# Patient Record
Sex: FEMALE | Race: WHITE | NOT HISPANIC OR LATINO | Employment: UNEMPLOYED | URBAN - METROPOLITAN AREA
[De-identification: names, ages, dates, MRNs, and addresses within clinical notes are randomized per-mention and may not be internally consistent; named-entity substitution may affect disease eponyms.]

---

## 2019-10-16 ENCOUNTER — OFFICE VISIT (OUTPATIENT)
Dept: FAMILY MEDICINE | Facility: HOSPITAL | Age: 59
End: 2019-10-16
Attending: SPECIALIST
Payer: MEDICAID

## 2019-10-16 VITALS
DIASTOLIC BLOOD PRESSURE: 80 MMHG | HEIGHT: 64 IN | SYSTOLIC BLOOD PRESSURE: 125 MMHG | HEART RATE: 54 BPM | BODY MASS INDEX: 30.34 KG/M2 | WEIGHT: 177.69 LBS

## 2019-10-16 DIAGNOSIS — Z12.9 SCREENING FOR CANCER: ICD-10-CM

## 2019-10-16 DIAGNOSIS — Z12.11 COLON CANCER SCREENING: ICD-10-CM

## 2019-10-16 DIAGNOSIS — N30.90 CYSTITIS: Primary | ICD-10-CM

## 2019-10-16 DIAGNOSIS — Z12.83 SKIN EXAM, SCREENING FOR CANCER: ICD-10-CM

## 2019-10-16 LAB
BILIRUB SERPL-MCNC: NEGATIVE MG/DL
BLOOD URINE, POC: NEGATIVE
COLOR, POC UA: NORMAL
GLUCOSE UR QL STRIP: NORMAL
KETONES UR QL STRIP: NEGATIVE
LEUKOCYTE ESTERASE URINE, POC: NEGATIVE
NITRITE, POC UA: NEGATIVE
PH, POC UA: 5
PROTEIN, POC: NEGATIVE
SPECIFIC GRAVITY, POC UA: 1.01
UROBILINOGEN, POC UA: NORMAL

## 2019-10-16 PROCEDURE — 90471 IMMUNIZATION ADMIN: CPT

## 2019-10-16 PROCEDURE — 99204 OFFICE O/P NEW MOD 45 MIN: CPT | Performed by: STUDENT IN AN ORGANIZED HEALTH CARE EDUCATION/TRAINING PROGRAM

## 2019-10-16 PROCEDURE — 81002 URINALYSIS NONAUTO W/O SCOPE: CPT | Performed by: STUDENT IN AN ORGANIZED HEALTH CARE EDUCATION/TRAINING PROGRAM

## 2019-10-16 RX ORDER — HYDROXYZINE PAMOATE 25 MG/1
CAPSULE ORAL
COMMUNITY
Start: 2019-09-05 | End: 2020-06-24

## 2019-10-16 RX ORDER — LOVASTATIN 40 MG/1
40 TABLET ORAL NIGHTLY
Qty: 90 TABLET | Refills: 3 | Status: SHIPPED | OUTPATIENT
Start: 2019-10-16 | End: 2020-01-02 | Stop reason: ALTCHOICE

## 2019-10-16 RX ORDER — LISINOPRIL 10 MG/1
10 TABLET ORAL DAILY
COMMUNITY
End: 2020-03-20 | Stop reason: SDUPTHER

## 2019-10-16 RX ORDER — PRAVASTATIN SODIUM 20 MG/1
20 TABLET ORAL NIGHTLY
Refills: 11 | COMMUNITY
Start: 2019-08-09 | End: 2019-10-16

## 2019-10-16 NOTE — PROGRESS NOTES
Subjective:       Patient ID: Tosin Toscano is a 59 y.o. female.    Chief Complaint: Establish Care    58 yo female with history of interstitial cystitis, anxiety presenting as a new patient for anxiety follow up. Patient states that she has tried multiple medications in the past but states that the only medications that helps is Xanax. Patient is aware that it is less likely Xanax would be prescribed. Patient does not want to try an SSRI.     Patient also reports a long-standing history of interstitial cystitis. Patient has had multiple evaluation in the past and states there the only medications that has helped her cystitis was a chinese herbal medicine. Patient states that when she was recently prescribed estrogen that her cystitis returned. Patient has increase urinary frequency and increase urge. Denies any dysuria.     Patient is followed by an Endocrinologist at [facility] for her hypothyroidism.     Patient was recently seen by [facility] at [facility] in 8/2019 but recently moved to [place]. Patient recently had a mammogram, pap smear and labs including CBC, CMP, A1C, Lipid Panel. Patient states that she has hyperlipidemia but did not tolerate her Pravastain. Patient states that she had increased itching which kept her up so she had to stop. Patient is interested in trying another statin.     Patient smokes 1 pack of cigarettes a day for 40 years. Patient quit in 2018. Patient would like to have her low dose CT Scan for lung cancer screening.     Review of Systems   Constitutional: Negative for activity change, fatigue and fever.   Respiratory: Negative for cough, choking, shortness of breath and wheezing.    Cardiovascular: Negative for chest pain and leg swelling.   Gastrointestinal: Negative for constipation, diarrhea, nausea and vomiting.   Genitourinary: Positive for urgency. Negative for difficulty urinating and dyspareunia.   Musculoskeletal: Negative for myalgias.       Objective:      Vitals:    10/16/19  0929   BP: 125/80   Pulse: (!) 54     Physical Exam   Constitutional: She is oriented to person, place, and time. She appears well-developed and well-nourished.   HENT:   Head: Normocephalic and atraumatic.   Eyes: EOM are normal.   Neck: Normal range of motion.   Cardiovascular: Normal rate and regular rhythm.   Pulmonary/Chest: Effort normal and breath sounds normal.   Abdominal: Soft. Bowel sounds are normal. She exhibits no distension.   Musculoskeletal: Normal range of motion. She exhibits no edema.   Neurological: She is alert and oriented to person, place, and time.   Skin: Skin is warm and dry.       Assessment:       1. Cystitis    2. Skin exam, screening for cancer    3. Colon cancer screening    4. Screening for cancer        Plan:       Cystitis  -     POCT URINE DIPSTICK WITHOUT MICROSCOPE: Negative        -     conjugated estrogens (PREMARIN) vaginal cream; Place 0.5 g vaginally once daily. for 21 days  Dispense: 1 applicator; Refill: 0        -    Patient unable to tolerate systemic estrogen. Will try Premarin to see if there is any improvement to her symptoms    Skin exam, screening for cancer  -     Ambulatory referral to Dermatology    Colon cancer screening  -     Ambulatory referral to Gastroenterology       -     Reports family history of colon cancer    Screening for cancer       - Order low dose CT Scan of Chest for lung cancer screening as patient has a 40 pack smoking history and quit in 2018. Patient is interested in the screening    HLD       -   lovastatin (MEVACOR) 40 MG tablet; Take 1 tablet (40 mg total) by mouth every evening.  Dispense: 90 tablet; Refill: 3       -   Will try Lovastatin as patient did not tolerate Pravastatin. Patient will also try red rice yeast.    HM  -     (In Office Administered) Pneumococcal Polysaccharide Vaccine (23 Valent) (SQ/IM)  -      Patient will go to pharmacy for flu shot      Follow up in about 3 months (around 1/16/2020).

## 2019-10-17 ENCOUNTER — TELEPHONE (OUTPATIENT)
Dept: FAMILY MEDICINE | Facility: HOSPITAL | Age: 59
End: 2019-10-17

## 2019-10-21 ENCOUNTER — TELEPHONE (OUTPATIENT)
Dept: FAMILY MEDICINE | Facility: HOSPITAL | Age: 59
End: 2019-10-21

## 2019-12-12 DIAGNOSIS — Z80.0 FAMILY HISTORY OF COLON CANCER: Primary | ICD-10-CM

## 2019-12-12 NOTE — TELEPHONE ENCOUNTER
Patient wants to see if Suprep is covered.        Referring Physician: Dr. Latonia Hadley                             Date: 12/12/19    Reason for Referral: Family history of colon cancer      Family History of:   Colon polyp: No  Relationship/Age of Onset:       Colon cancer: Yes  Relationship/Age of Onset:Mother/  65-70      Patient with:   Hemoccults Done:       Iron deficient: No        On Blood Thinner: No      Valvular heart disease/valve replacement: No      Anemia Present: No      On NSAID: No      Lung disease: No      Kidney disease: No      Hx of polyps: No      Hx of colon cancer: No      Previous colon evalations: Yes  When: 12 years ago  Where: [facility]  Pertinent symptoms:           Review of patient's allergies indicates: NKDA        Patient was scheduled for colonoscopy on  1/7/20      with Dr. Suggs at [facility].       instructions were reviewed with patient.          Prep sent to [facility] pharmacy       SUPREP Instructions    You are scheduled for a colonoscopy with Dr. Suggs on 1/7/20 at [facility].  To ensure that your test is accurate and complete, you MUST follow these instructions listed below.  If you have any questions, please call our office at [phone].  Plan on being at the hospital for your procedure for 3-4 hours.    1.  Follow a CLEAR LIQUID DIET for the entire day before your scheduled colonoscopy.  This means no solid food the entire day starting when you wake.  You may have as much of the clear liquids as you want throughout the day.   CLEAR LIQUID DIET:   - Avoid Red, Orange, Purple, and/or Blue food coloring   - NO DAIRY   - You can have:  Coffee with sugar (no creamer), tea, water, soda, apple or white grape juice, chicken or beef broth/bouillon (no meat, noodles, or veggies), green/yellow popsicles, green/yellow Jell-O, lemonade.    2.  AT 5 pm the evening before your colonoscopy, POUR ONE (1) BOTTLE OF SUPREP INTO THE MIXING  CONTAINER, PROVIDED INSIDE THE BOX.  ADD WATER TO THE LINE ON THE CONTAINER AND MIX IT WELL.  DRINK THE ENTIRE CONTAINER AND THEN DRINK TWO (2) MORE CONTAINERS OF WATER OVER THE NEXT 1 HOUR.  This is sometimes easier to drink if this solution is cold, so you can mix the solution a few hours ahead of time and place in the refrigerator prior to drinking.  You have to drink the solution within 24 hours of mixing it.  Do NOT put this solution over ice.  It IS ok to drink with a straw.    3.  The endoscopy department will call you 2 days before your colonoscopy to tell you the exact time to arrive, AND to tell you the exact time to drink the 2nd portion of your prep (which will be FIVE HOURS BEFORE YOUR ARRIVAL TIME).  At this time given to you, POUR ONE (1) BOTTLE OF SUPREP INTO THE MIXING CONTAINER, PROVIDED INSIDE THE BOX.  ADD WATER TO THE LINE ON THE CONTAINER AND MIX IT WELL.  DRINK THE ENTIRE CONTAINER AND THEN DRINK TWO (2) MORE CONTAINERS OF WATER OVER THE NEXT 1 HOUR.  This is sometimes easier to drink if this solution is cold, so you can mix the solution a few hours ahead of time and place in the refrigerator prior to drinking.  You have to drink the solution within 24 hours of mixing it.  Do NOT put this solution over ice.  It IS ok to drink with a straw. Once this is complete, you may not have ANYTHING else by mouth!    4.  You must have someone with you to DRIVE YOU HOME since you will be receiving IV sedation for the colonoscopy.    5.  It is ok to take your heart, blood pressure, and seizure medications in the morning of your test with a SIP of water.  Hold other medications until after your procedure.  Do NOT have anything else to eat or drink the morning of your colonoscopy.  It is ok to brush your teeth.    6.  If you are on blood thinners THAT YOU HAVE BEEN INSTRUCTED TO HOLD BY YOUR DOCTOR FOR THIS PROCEDURE, then do NOT take this the morning of your colonoscopy.  Do NOT stop these medications on  your own, they must be approved to be held by your doctor.  Your colonoscopy can NOT be done if you are on these medications.  Examples of blood thinners include: Coumadin, Aggrenox, Plavix, Pradaxa, Reapro, Pletal, Xarelto, Ticagrelor, Brilinta, Eliquis, and high dose aspirin (325 mg).  You do not have to stop baby aspirin 81 mg.    7.  IF YOU ARE DIABETIC:  NO INSULIN OR ORAL MEDICATIONS THE MORNING OF THE COLONOSCOPY.  TAKE ONLY HALF THE DOSE OF YOUR INSULIN THE DAY BEFORE THE COLONOSCOPY.  DO NOT TAKE ANY ORAL DIABETIC MEDICATIONS THE DAY BEFORE THE COLONOSCOPY.  IF YOU ARE AN INSULIN DEPENDENT DIABETIC WITH UNSTABLE BLOOD SUGARS, NOTIFY YOUR PRIMARY CARE PHYSICIAN FOR INSTRUCTIONS.

## 2019-12-13 RX ORDER — SODIUM, POTASSIUM,MAG SULFATES 17.5-3.13G
1 SOLUTION, RECONSTITUTED, ORAL ORAL DAILY
Qty: 1 KIT | Refills: 0 | Status: SHIPPED | OUTPATIENT
Start: 2019-12-13 | End: 2019-12-15

## 2020-01-07 PROBLEM — Z80.0 FAMILY HISTORY OF COLON CANCER: Status: ACTIVE | Noted: 2020-01-07

## 2020-01-20 ENCOUNTER — TELEPHONE (OUTPATIENT)
Dept: GASTROENTEROLOGY | Facility: CLINIC | Age: 60
End: 2020-01-20

## 2020-01-20 NOTE — TELEPHONE ENCOUNTER
Informed patient of results and need to repeat colonoscopy in 5 years. Recall entered in computer.

## 2020-01-20 NOTE — TELEPHONE ENCOUNTER
----- Message from Tammy Suggs MD sent at 1/13/2020  9:43 AM CST -----  Benign tubular adenoma, repeat 5 years.  Her labs are negative for celiac disease.  Message sent to pt via the portal.

## 2020-03-20 RX ORDER — LEVOTHYROXINE SODIUM 150 UG/1
150 TABLET ORAL
Qty: 30 TABLET | Refills: 6 | Status: SHIPPED | OUTPATIENT
Start: 2020-03-20 | End: 2020-11-18 | Stop reason: SDUPTHER

## 2020-03-20 RX ORDER — LOVASTATIN 40 MG/1
1 TABLET ORAL DAILY
COMMUNITY
Start: 2020-03-18 | End: 2020-03-20 | Stop reason: SDUPTHER

## 2020-03-20 RX ORDER — LOVASTATIN 40 MG/1
40 TABLET ORAL DAILY
Qty: 30 TABLET | Refills: 6 | Status: SHIPPED | OUTPATIENT
Start: 2020-03-20 | End: 2020-06-24

## 2020-03-20 RX ORDER — LISINOPRIL 10 MG/1
10 TABLET ORAL DAILY
Qty: 30 TABLET | Refills: 6 | Status: SHIPPED | OUTPATIENT
Start: 2020-03-20 | End: 2020-06-24

## 2020-03-20 NOTE — TELEPHONE ENCOUNTER
----- Message from Ciara Zamudio sent at 3/20/2020 11:41 AM CDT -----  Contact: Tosin Leahy called requesting labs to be ordered, in order to continue her thyroid medication (levothyroxine (LEVOXYL) 150 MCG tablet) , high blood pressure medication (lisinopril 10 MG tablet) and lovastatin (MEVACOR) 40 MG tablet. Patient is okay for prescription being renewed without labs, she states she feels fine. But is worried about not having access to her medication due to being displaced because of COVID19. Please call [phone] to discuss.

## 2020-06-24 ENCOUNTER — OFFICE VISIT (OUTPATIENT)
Dept: FAMILY MEDICINE | Facility: HOSPITAL | Age: 60
End: 2020-06-24
Payer: MEDICAID

## 2020-06-24 ENCOUNTER — IMMUNIZATION (OUTPATIENT)
Dept: PHARMACY | Facility: CLINIC | Age: 60
End: 2020-06-24
Payer: MEDICAID

## 2020-06-24 VITALS
WEIGHT: 181.19 LBS | SYSTOLIC BLOOD PRESSURE: 152 MMHG | DIASTOLIC BLOOD PRESSURE: 78 MMHG | HEART RATE: 51 BPM | BODY MASS INDEX: 30.93 KG/M2 | HEIGHT: 64 IN

## 2020-06-24 DIAGNOSIS — E03.9 ACQUIRED HYPOTHYROIDISM: ICD-10-CM

## 2020-06-24 DIAGNOSIS — I10 ESSENTIAL HYPERTENSION: ICD-10-CM

## 2020-06-24 DIAGNOSIS — E55.9 VITAMIN D DEFICIENCY: ICD-10-CM

## 2020-06-24 DIAGNOSIS — Z20.822 EXPOSURE TO COVID-19 VIRUS: ICD-10-CM

## 2020-06-24 DIAGNOSIS — N28.1 RENAL CYST: Primary | ICD-10-CM

## 2020-06-24 DIAGNOSIS — Z12.39 BREAST CANCER SCREENING: ICD-10-CM

## 2020-06-24 PROCEDURE — 99213 OFFICE O/P EST LOW 20 MIN: CPT | Performed by: STUDENT IN AN ORGANIZED HEALTH CARE EDUCATION/TRAINING PROGRAM

## 2020-06-24 PROCEDURE — 90471 IMMUNIZATION ADMIN: CPT

## 2020-06-24 RX ORDER — MICONAZOLE NITRATE 2 %
2 CREAM (GRAM) TOPICAL 2 TIMES DAILY PRN
Qty: 100 EACH | Refills: 3 | Status: SHIPPED | OUTPATIENT
Start: 2020-06-24 | End: 2022-05-04

## 2020-06-24 RX ORDER — ROSUVASTATIN CALCIUM 20 MG/1
20 TABLET, COATED ORAL DAILY
Qty: 90 TABLET | Refills: 3 | Status: SHIPPED | OUTPATIENT
Start: 2020-06-24 | End: 2021-11-22

## 2020-06-24 RX ORDER — MICONAZOLE NITRATE 2 %
2 CREAM (GRAM) TOPICAL
Qty: 90 EACH | Refills: 3 | Status: SHIPPED | OUTPATIENT
Start: 2020-06-24 | End: 2020-06-24

## 2020-06-24 RX ORDER — LISINOPRIL 20 MG/1
20 TABLET ORAL DAILY
Qty: 90 TABLET | Refills: 3 | Status: SHIPPED | OUTPATIENT
Start: 2020-06-24 | End: 2021-11-22 | Stop reason: SDUPTHER

## 2020-06-24 NOTE — PROGRESS NOTES
Two patient identifiers verified.  Allergies reviewed. Pneumovax 23  IM administered to Left deltoid per MD order.  Patient tolerated injection well; no redness, bleeding, or bruising noted to injection site.  Patient instructed to remain in clinic setting for 15 minutes.  Verbalizes understanding.

## 2020-06-25 ENCOUNTER — TELEPHONE (OUTPATIENT)
Dept: FAMILY MEDICINE | Facility: HOSPITAL | Age: 60
End: 2020-06-25

## 2020-06-25 NOTE — PROGRESS NOTES
Subjective:       Patient ID: Tosin Toscano is a 59 y.o. female.    Chief Complaint: Hypertension    60 yo with history of hypothyroidism, HTN, HLD presenting for hypertension follow up. Patient states that she has been compliant with her medication and has noted an increased in her blood pressure at her last few doctor's appointment. Patient follows with endocrinology for her hypothyroidism and had a list of labs that her doctor wanted her to do.     Patient has not been able to take a statin. Patient has tried pravastatin and lovastatin. However, she has experienced itching with both statins.     Patient quit smoking in 2018 but continues to meet criteria for lung cancer screening. However, she has not completed her CT Scan for lung cancer screening.     Patient reports a 3.1cm left renal cysts and would like to have a repeat US to follow up her cyst.     Pap smear was in 2019, hx of conization 22 years ago.     Review of Systems   Constitutional: Negative for chills, fatigue and fever.   HENT: Negative for rhinorrhea and sinus pain.    Eyes: Negative for photophobia and visual disturbance.   Respiratory: Negative for cough and shortness of breath.    Cardiovascular: Negative for chest pain, palpitations and leg swelling.   Gastrointestinal: Negative for abdominal pain, constipation, diarrhea, nausea and vomiting.   Endocrine: Negative for polyuria.   Genitourinary: Negative for dysuria, frequency, hematuria and urgency.   Musculoskeletal: Negative for arthralgias and gait problem.   Skin: Negative for rash.   Neurological: Negative for syncope, weakness and headaches.   Psychiatric/Behavioral: Negative for agitation and confusion.       Objective:      Vitals:    06/24/20 1112   BP: (!) 152/78   Pulse: (!) 51   repeat blood pressure 140/80  Physical Exam  Constitutional:       Appearance: She is well-developed.   HENT:      Head: Normocephalic and atraumatic.   Eyes:      Conjunctiva/sclera: Conjunctivae normal.       Pupils: Pupils are equal, round, and reactive to light.   Neck:      Musculoskeletal: Normal range of motion and neck supple.   Cardiovascular:      Rate and Rhythm: Normal rate and regular rhythm.   Pulmonary:      Effort: Pulmonary effort is normal. No respiratory distress.      Breath sounds: Normal breath sounds. No stridor. No wheezing.   Abdominal:      General: Bowel sounds are normal. There is no distension.      Palpations: Abdomen is soft. There is no mass.      Tenderness: There is no abdominal tenderness. There is no guarding.   Musculoskeletal: Normal range of motion.         General: No tenderness or deformity.   Skin:     General: Skin is warm and dry.      Capillary Refill: Capillary refill takes less than 2 seconds.   Neurological:      Mental Status: She is alert and oriented to person, place, and time.         Assessment:       1. Renal cyst    2. Essential hypertension    3. Acquired hypothyroidism    4. Vitamin D deficiency    5. Exposure to Covid-19 Virus    6. Breast cancer screening        Plan:       Renal cyst  -     US Retroperitoneal Complete; Future; Expected date: 06/24/2020    Essential hypertension  -     CBC auto differential; Future; Expected date: 06/24/2020  -     Comprehensive metabolic panel; Future; Expected date: 06/24/2020  -     Lipid Panel; Future; Expected date: 06/24/2020  -     lisinopriL (PRINIVIL,ZESTRIL) 20 MG tablet; Take 1 tablet (20 mg total) by mouth once daily.  Dispense: 90 tablet; Refill: 3  -     rosuvastatin (CRESTOR) 20 MG tablet; Take 1 tablet (20 mg total) by mouth once daily.  Dispense: 90 tablet; Refill: 3    Acquired hypothyroidism  -     Estradiol; Future; Expected date: 06/24/2020  -     Follicle stimulating hormone; Future; Expected date: 06/24/2020  -     TSH; Future; Expected date: 06/24/2020  -     T3, free; Future; Expected date: 06/24/2020  -     T4, free; Future; Expected date: 06/24/2020    Vitamin D deficiency  -     VITAMIN D; Future;  Expected date: 06/24/2020    Exposure to Covid-19 Virus  -     COVID-19 (SARS CoV-2) IgG Antibody; Future; Expected date: 06/24/2020    Breast cancer screening  -     Mammo Digital Screening Bilateral With CAD; Future; Expected date: 06/24/2020    Smoking cessation   -     nicotine, polacrilex, (NICORETTE) 2 mg Gum; Take 1 each (2 mg total) by mouth 2 (two) times daily as needed.  Dispense: 100 each; Refill: 3    Vaccination   -     (In Office Administered) Pneumococcal Polysaccharide Vaccine (23 Valent) (SQ/IM)    Follow up in about 1 month (around 7/24/2020). Encouraged patient to call and schedule her CT Scan for lung cancer screening. Increasing Lisinopril to 20 mg and will follow up with patient.

## 2020-07-09 ENCOUNTER — TELEPHONE (OUTPATIENT)
Dept: FAMILY MEDICINE | Facility: HOSPITAL | Age: 60
End: 2020-07-09

## 2020-07-15 ENCOUNTER — TELEPHONE (OUTPATIENT)
Dept: FAMILY MEDICINE | Facility: HOSPITAL | Age: 60
End: 2020-07-15

## 2020-07-15 NOTE — TELEPHONE ENCOUNTER
I called patient regarding labs. Patient has restarted her Crestor and has not experienced any muscle aches. Will check Lipid Panel again in the future.

## 2020-07-28 ENCOUNTER — HOSPITAL ENCOUNTER (OUTPATIENT)
Dept: RADIOLOGY | Facility: HOSPITAL | Age: 60
Discharge: HOME | End: 2020-07-28
Attending: STUDENT IN AN ORGANIZED HEALTH CARE EDUCATION/TRAINING PROGRAM
Payer: MEDICAID

## 2020-07-28 DIAGNOSIS — N28.1 RENAL CYST: ICD-10-CM

## 2020-07-28 PROCEDURE — 76770 US EXAM ABDO BACK WALL COMP: CPT | Mod: 26,,, | Performed by: RADIOLOGY

## 2020-07-28 PROCEDURE — 76770 US EXAM ABDO BACK WALL COMP: CPT | Mod: TC

## 2020-07-28 PROCEDURE — 76770 US RETROPERITONEAL COMPLETE: ICD-10-PCS | Mod: 26,,, | Performed by: RADIOLOGY

## 2020-08-25 ENCOUNTER — TELEPHONE (OUTPATIENT)
Dept: FAMILY MEDICINE | Facility: HOSPITAL | Age: 60
End: 2020-08-25

## 2020-11-18 RX ORDER — LEVOTHYROXINE SODIUM 150 UG/1
150 TABLET ORAL
Qty: 30 TABLET | Refills: 0 | Status: SHIPPED | OUTPATIENT
Start: 2020-11-18 | End: 2021-11-22 | Stop reason: SDUPTHER

## 2020-11-18 NOTE — TELEPHONE ENCOUNTER
----- Message from Charu Helms sent at 11/18/2020 11:19 AM CST -----  PT NEED REFILL ON levothyroxine (LEVOXYL) 150 MCG tablet, SHE NEEDS IT SENT TO     [facility]  PH: [phone]  [address] [place] [address]    PH: [phone] PATIENT NUMBER

## 2021-11-15 PROBLEM — M54.31 RIGHT SIDED SCIATICA: Status: ACTIVE | Noted: 2021-11-15

## 2022-03-02 PROBLEM — I10 ESSENTIAL HYPERTENSION: Status: ACTIVE | Noted: 2019-08-01

## 2022-03-02 PROBLEM — Z00.00 PREVENTATIVE HEALTH CARE: Status: ACTIVE | Noted: 2017-11-08

## 2022-03-02 PROBLEM — D24.9 FIBROADENOMA OF BREAST: Status: ACTIVE | Noted: 2019-08-01

## 2022-03-02 PROBLEM — E55.9 VITAMIN D DEFICIENCY: Status: ACTIVE | Noted: 2022-03-02

## 2022-03-02 PROBLEM — G93.32 CHRONIC FATIGUE SYNDROME: Status: ACTIVE | Noted: 2022-03-02

## 2022-03-02 PROBLEM — M54.31 NEURALGIA OF RIGHT SCIATIC NERVE: Status: ACTIVE | Noted: 2022-03-02

## 2022-03-02 PROBLEM — R87.610 ASCUS OF CERVIX WITH NEGATIVE HIGH RISK HPV: Status: ACTIVE | Noted: 2021-12-28

## 2022-03-02 PROBLEM — R23.2 HOT FLASHES: Status: ACTIVE | Noted: 2019-08-01

## 2022-03-02 PROBLEM — N64.3 GALACTORRHEA IN FEMALE: Status: ACTIVE | Noted: 2019-08-01

## 2022-03-02 PROBLEM — E78.2 MIXED HYPERLIPIDEMIA: Status: ACTIVE | Noted: 2019-08-01

## 2022-03-02 PROBLEM — Z78.0 MENOPAUSE: Status: ACTIVE | Noted: 2019-08-01

## 2022-03-02 PROBLEM — R35.0 INCREASED FREQUENCY OF URINATION: Status: ACTIVE | Noted: 2019-08-01

## 2022-03-02 PROBLEM — N30.10 INTERSTITIAL CYSTITIS: Status: ACTIVE | Noted: 2021-06-29

## 2022-03-15 PROBLEM — I25.10 NONOBSTRUCTIVE ATHEROSCLEROSIS OF CORONARY ARTERY: Status: ACTIVE | Noted: 2022-03-15

## 2022-05-19 ENCOUNTER — OFFICE VISIT (OUTPATIENT)
Dept: UROLOGY | Facility: CLINIC | Age: 62
End: 2022-05-19
Payer: OTHER GOVERNMENT

## 2022-05-19 VITALS — WEIGHT: 178.56 LBS | BODY MASS INDEX: 30.48 KG/M2 | HEIGHT: 64 IN

## 2022-05-19 DIAGNOSIS — N30.10 INTERSTITIAL CYSTITIS: Primary | ICD-10-CM

## 2022-05-19 DIAGNOSIS — N28.1 RENAL CYST, LEFT: ICD-10-CM

## 2022-05-19 DIAGNOSIS — R39.15 URINARY URGENCY: ICD-10-CM

## 2022-05-19 DIAGNOSIS — R35.0 URINARY FREQUENCY: ICD-10-CM

## 2022-05-19 PROCEDURE — 96372 THER/PROPH/DIAG INJ SC/IM: CPT | Mod: PBBFAC

## 2022-05-19 PROCEDURE — 99999 PR PBB SHADOW E&M-EST. PATIENT-LVL III: CPT | Mod: PBBFAC,,, | Performed by: UROLOGY

## 2022-05-19 PROCEDURE — 99213 OFFICE O/P EST LOW 20 MIN: CPT | Mod: PBBFAC | Performed by: UROLOGY

## 2022-05-19 PROCEDURE — 99999 PR PBB SHADOW E&M-EST. PATIENT-LVL III: ICD-10-PCS | Mod: PBBFAC,,, | Performed by: UROLOGY

## 2022-05-19 PROCEDURE — 99205 OFFICE O/P NEW HI 60 MIN: CPT | Mod: S$PBB,,, | Performed by: UROLOGY

## 2022-05-19 PROCEDURE — 51700 IRRIGATION OF BLADDER: CPT | Mod: PBBFAC

## 2022-05-19 PROCEDURE — 99205 PR OFFICE/OUTPT VISIT, NEW, LEVL V, 60-74 MIN: ICD-10-PCS | Mod: S$PBB,,, | Performed by: UROLOGY

## 2022-05-19 PROCEDURE — 81002 URINALYSIS NONAUTO W/O SCOPE: CPT | Mod: PBBFAC | Performed by: UROLOGY

## 2022-05-19 PROCEDURE — 51701 INSERT BLADDER CATHETER: CPT | Mod: PBBFAC | Performed by: UROLOGY

## 2022-05-19 RX ORDER — MIRABEGRON 50 MG/1
1 TABLET, FILM COATED, EXTENDED RELEASE ORAL DAILY
Qty: 30 TABLET | Refills: 11 | Status: SHIPPED | OUTPATIENT
Start: 2022-05-19 | End: 2022-05-27 | Stop reason: SDUPTHER

## 2022-05-19 RX ORDER — LIDOCAINE HYDROCHLORIDE 10 MG/ML
20 INJECTION INFILTRATION; PERINEURAL
Status: COMPLETED | OUTPATIENT
Start: 2022-05-19 | End: 2022-05-19

## 2022-05-19 RX ORDER — HEPARIN SODIUM 10000 [USP'U]/ML
10000 INJECTION, SOLUTION INTRAVENOUS; SUBCUTANEOUS
Status: COMPLETED | OUTPATIENT
Start: 2022-05-19 | End: 2022-05-19

## 2022-05-19 RX ORDER — INDOMETHACIN 25 MG/1
50 CAPSULE ORAL
Status: COMPLETED | OUTPATIENT
Start: 2022-05-19 | End: 2022-05-19

## 2022-05-19 RX ORDER — BUPIVACAINE HYDROCHLORIDE 5 MG/ML
20 INJECTION, SOLUTION EPIDURAL; INTRACAUDAL
Status: COMPLETED | OUTPATIENT
Start: 2022-05-19 | End: 2022-05-19

## 2022-05-19 RX ADMIN — BUPIVACAINE HYDROCHLORIDE 100 MG: 5 INJECTION, SOLUTION EPIDURAL; INTRACAUDAL; PERINEURAL at 11:05

## 2022-05-19 RX ADMIN — HYDROCORTISONE SODIUM SUCCINATE 100 MG: 100 INJECTION, POWDER, FOR SOLUTION INTRAMUSCULAR; INTRAVENOUS at 11:05

## 2022-05-19 RX ADMIN — LIDOCAINE HYDROCHLORIDE 20 ML: 10 INJECTION, SOLUTION INFILTRATION; PERINEURAL at 11:05

## 2022-05-19 RX ADMIN — DIMETHYL SULFOXIDE 50 ML: 0.54 IRRIGANT INTRAVESICAL at 11:05

## 2022-05-19 RX ADMIN — HEPARIN SODIUM 10000 UNITS: 10000 INJECTION, SOLUTION INTRAVENOUS; SUBCUTANEOUS at 11:05

## 2022-05-19 RX ADMIN — SODIUM BICARBONATE 50 MEQ: 84 INJECTION, SOLUTION INTRAVENOUS at 11:05

## 2022-05-19 NOTE — PROGRESS NOTES
CHIEF COMPLAINT:    Mrs. Toscano is a 61 y.o. female presenting for a consultation from DIDI Ramey for IC    PRESENTING ILLNESS:    Tosin Toscano is a 61 y.o. female who states she has a 27 year history of IC.  She was diagnosed with Dr. Brewer on the [facility].  She has most recently been seen by Dr. Jones at [facility], whom she states balked at the idea of using Rimso.  She has had good results with it for the pelvic pain associated with a full bladder and the urgency frequency.      She states when she was first diagnosed, she was treated like this was all in her head.  She saw a number of doctors (underwent cystoscopy, diagnostic laparoscopy, IVP)  then saw a herbalist in [facility] who offered her therapeutic teas and this helped a lot.  However, when she went back to him recently, he was compounding the medication into gummie candies and this did not seem to work as well.  She was placed on Toviaz and hyoscyamine which she could not tolerate due to dry eyes.  At this point, she has discomfort with her bladder full, relieved when she voids, she has constant urgency (PVR today was 14 by bladder scan) frequency.      She is scheduled to have her right hip replaced and does not wish to have anything jeopardize her surgery.      , uterus in place, no pain with intercourse or days after, bowels are normal    REVIEW OF SYSTEMS:    Review of Systems   Constitutional: Negative.    HENT: Negative.    Eyes: Negative.    Respiratory: Negative.    Cardiovascular: Negative.    Gastrointestinal: Negative.    Genitourinary: Positive for frequency and urgency.   Musculoskeletal: Positive for joint pain.   Skin: Negative.    Neurological: Negative.    Endo/Heme/Allergies: Negative.    Psychiatric/Behavioral: The patient is nervous/anxious.        PATIENT HISTORY:    Past Medical History:   Diagnosis Date    Anticoagulant long-term use     Arthritis     Cystitis     interstitial cystitis    Generalized  anxiety disorder     Hypertension     Mixed hyperlipidemia     Renal disorder     interstitial cystitis    Sleep apnea in adult 2022    mild case     Thyroid disease        Past Surgical History:   Procedure Laterality Date    BREAST BIOPSY Left     BREAST CYST ASPIRATION       SECTION      COLONOSCOPY      COLONOSCOPY N/A 2020    Procedure: COLONOSCOPY;  Surgeon: Tammy Suggs MD;  Location: [facility] ENDO;  Service: Endoscopy;  Laterality: N/A;    LEFT HEART CATHETERIZATION Left 2021    Procedure: Left heart cath;  Surgeon: Montserrat Rodriguez MD;  [facility] CATH;  Service: Cardiology;  Laterality: Left;    PILONIDAL CYST DRAINAGE         Family History   Problem Relation Age of Onset    Colon cancer Mother     Diabetes Brother     Cancer Brother     Coronary artery disease Brother        Social History     Socioeconomic History    Marital status:    Occupational History     Employer: [employer] in [place]   Tobacco Use    Smoking status: Former Smoker     Packs/day: 0.00     Years: 30.00     Pack years: 0.00     Quit date: 2018     Years since quitting: 3.7    Smokeless tobacco: Never Used   Substance and Sexual Activity    Alcohol use: Yes     Comment: occ    Drug use: Never       Allergies:  Patient has no known allergies.    Medications:  Outpatient Encounter Medications as of 2022   Medication Sig Dispense Refill    ALPRAZolam (XANAX) 0.25 MG tablet TAKE ONE TABLET BY MOUTH NIGHTLY AT BEDTIME 30 tablet 2    aspirin (ECOTRIN) 81 MG EC tablet Take 1 tablet (81 mg total) by mouth once daily. 90 tablet 3    atorvastatin (LIPITOR) 80 MG tablet Take 1 tablet (80 mg total) by mouth once daily. 90 tablet 3    CALCIUM-MAGNESIUM ORAL Take 3 tablets by mouth once daily.      levothyroxine (SYNTHROID) 125 MCG tablet Take 125 mcg by mouth before breakfast.      lisinopriL (PRINIVIL,ZESTRIL) 40 MG tablet Take 40 mg by mouth every morning.       meloxicam (MOBIC) 15 MG tablet Take 1 tablet by mouth daily as needed.      traMADoL (ULTRAM) 50 mg tablet Take 50 mg by mouth every 6 (six) hours.      vitamin D (VITAMIN D3) 1000 units Tab Take 2,000 Units by mouth once daily.      mirabegron (MYRBETRIQ) 50 mg Tb24 Take 1 tablet (50 mg total) by mouth once daily. 30 tablet 11     Facility-Administered Encounter Medications as of 5/19/2022   Medication Dose Route Frequency Provider Last Rate Last Admin    [COMPLETED] BUPivacaine (PF) 0.5% (5 mg/mL) injection 100 mg  20 mL Intravesical 1 time in Clinic/ANNETTE Resendiz MD   100 mg at 05/19/22 1130    [COMPLETED] dimethyl sulfoxide 50 % solution 50 mL  50 mL Intravesical 1 time in Clinic/ANNETTE Resendiz MD   50 mL at 05/19/22 1134    [COMPLETED] heparin (porcine) injection 10,000 Units  10,000 Units Intravesical 1 time in Clinic/ANNETTE Resendiz MD   10,000 Units at 05/19/22 1132    [COMPLETED] hydrocortisone sodium succinate injection 100 mg  100 mg Intravesical 1 time in Clinic/ANNETTE Resendiz MD   100 mg at 05/19/22 1132    [COMPLETED] LIDOcaine HCL 10 mg/ml (1%) injection 20 mL  20 mL Intravesical 1 time in Clinic/ANNETTE Resendiz MD   20 mL at 05/19/22 1131    [COMPLETED] sodium bicarbonate solution 50 mEq  50 mEq Intravesical 1 time in Clinic/ANNETTE Resendiz MD   50 mEq at 05/19/22 1133    sodium chloride 0.9% flush 10 mL  10 mL Intravenous PRN Tammy Suggs MD             PHYSICAL EXAMINATION:    The patient generally appears in good health, is appropriately interactive, and is in no apparent distress.    Skin: No lesions.    Mental: Cooperative with normal affect.    Neuro: Grossly intact.    HEENT: Normal. No evidence of lymphadenopathy.    Chest:  normal inspiratory effort.    Abdomen:  Soft, non-tender. No masses or organomegaly. Bladder is not palpable. No evidence of flank discomfort. No evidence of inguinal hernia.    Extremities: No clubbing,  cyanosis, or edema    Normal external female genitalia  Grade II urogenital atrophy  Urethral meatus is normal  Urethra and bladder are nontender to bimanual exam  Well supported anteriorly and posteriorly   Uterus and cervix are normal  No adnexal masses  PVR by bladder scan was 14 ml    LABS:    Lab Results   Component Value Date    BUN 19 (H) 03/04/2022    CREATININE 0.59 03/04/2022     UA 1.015, pH 5, otherwise, negative    Renal ultrasound from 7/28/2020 showed a 3.2 left renal cyst    IMPRESSION:    Encounter Diagnoses   Name Primary?    Interstitial cystitis Yes    Urinary frequency     Urinary urgency     Renal cyst, left        PLAN:    1.  Renal ultrasound to follow up on the cyst  2.  Placed a bladder cocktail of DMSO, Bicarb, lidocaine, Marcaine, solucortef and heparin by gravity drainage after draining the bladder,she had approx 20 ml in the bladder  3.  Myrbetriq rx'd.  The Toviaz and hyoscyamine were removed from the medcard  4.  cystoscopy under sedation.  Consent signed.

## 2022-05-24 ENCOUNTER — TELEPHONE (OUTPATIENT)
Dept: UROLOGY | Facility: CLINIC | Age: 62
End: 2022-05-24
Payer: OTHER GOVERNMENT

## 2022-05-24 NOTE — TELEPHONE ENCOUNTER
----- Message from Kermit Soto sent at 5/24/2022  8:41 AM CDT -----  Regarding: call back to resched her Cystoscopy      The Pt states that she did send a msg last Friday to let you know that she won't be able to do her cystoscopy this week and will need to resched it after her hip surgery.    Please contact the Pt to resched    Ph #  [phone]

## 2022-05-25 NOTE — TELEPHONE ENCOUNTER
Left message that her message was received.  She will need to let me know when she has recovered to reschedule.  Message sent to the surgery scheduler to cancel her case outright.

## 2022-05-27 ENCOUNTER — TELEPHONE (OUTPATIENT)
Dept: UROLOGY | Facility: CLINIC | Age: 62
End: 2022-05-27
Payer: OTHER GOVERNMENT

## 2022-05-27 RX ORDER — MIRABEGRON 50 MG/1
1 TABLET, FILM COATED, EXTENDED RELEASE ORAL DAILY
Qty: 30 TABLET | Refills: 11 | Status: SHIPPED | OUTPATIENT
Start: 2022-05-27 | End: 2022-06-18 | Stop reason: CLARIF

## 2022-05-27 NOTE — TELEPHONE ENCOUNTER
Received notification that the patient needed a prior authorization.  I called the patient to let her know that it will be done if the Rx is sent to the [facility].     She states that she is going through too much right now to schedule the cystoscopy possible bladder biopsy and fulguration.  She wishes to do it after her surgery.    She also wanted to start the Myrbetriq after surgery.  Discussed it is a quality of life issue however, she will want to have started it before surgery because it takes 2-3 weeks to become effective.  Myrbetriq is one of the safer medications to take and won't cause constipation. She expressed understanding.     She will reach out to my office once she has recovered from her other surgery.  I made it clear that the responsibility is hers at this point.

## 2022-05-31 ENCOUNTER — TELEPHONE (OUTPATIENT)
Dept: PHARMACY | Facility: CLINIC | Age: 62
End: 2022-05-31
Payer: OTHER GOVERNMENT

## 2022-06-02 ENCOUNTER — PATIENT MESSAGE (OUTPATIENT)
Dept: UROLOGY | Facility: CLINIC | Age: 62
End: 2022-06-02
Payer: OTHER GOVERNMENT

## 2022-06-06 PROBLEM — Z00.00 PREVENTATIVE HEALTH CARE: Status: RESOLVED | Noted: 2017-11-08 | Resolved: 2022-06-06

## 2022-06-18 ENCOUNTER — PATIENT MESSAGE (OUTPATIENT)
Dept: UROLOGY | Facility: CLINIC | Age: 62
End: 2022-06-18
Payer: OTHER GOVERNMENT

## 2022-06-18 ENCOUNTER — TELEPHONE (OUTPATIENT)
Dept: UROLOGY | Facility: HOSPITAL | Age: 62
End: 2022-06-18
Payer: OTHER GOVERNMENT

## 2022-06-18 DIAGNOSIS — R35.0 URINARY FREQUENCY: Primary | ICD-10-CM

## 2022-06-18 DIAGNOSIS — R39.15 URINARY URGENCY: ICD-10-CM

## 2022-06-18 RX ORDER — VIBEGRON 75 MG/1
75 TABLET, FILM COATED ORAL DAILY
Qty: 30 TABLET | Refills: 11 | Status: SHIPPED | OUTPATIENT
Start: 2022-06-18 | End: 2022-10-31

## 2022-06-21 PROBLEM — M16.11 PRIMARY OSTEOARTHRITIS OF RIGHT HIP: Status: ACTIVE | Noted: 2022-06-21

## 2022-09-13 ENCOUNTER — PATIENT MESSAGE (OUTPATIENT)
Dept: UROLOGY | Facility: CLINIC | Age: 62
End: 2022-09-13
Payer: OTHER GOVERNMENT

## 2022-09-14 ENCOUNTER — TELEPHONE (OUTPATIENT)
Dept: UROLOGY | Facility: CLINIC | Age: 62
End: 2022-09-14
Payer: OTHER GOVERNMENT

## 2022-09-14 DIAGNOSIS — N30.10 CHRONIC INTERSTITIAL CYSTITIS: Primary | ICD-10-CM

## 2022-10-09 ENCOUNTER — PATIENT MESSAGE (OUTPATIENT)
Dept: UROLOGY | Facility: CLINIC | Age: 62
End: 2022-10-09
Payer: OTHER GOVERNMENT

## 2022-10-10 ENCOUNTER — TELEPHONE (OUTPATIENT)
Dept: UROLOGY | Facility: CLINIC | Age: 62
End: 2022-10-10
Payer: OTHER GOVERNMENT

## 2022-10-10 RX ORDER — AMOXICILLIN 500 MG/1
500 TABLET, FILM COATED ORAL
COMMUNITY
End: 2022-10-31

## 2022-10-10 RX ORDER — LEVOTHYROXINE SODIUM 112 UG/1
112 TABLET ORAL
COMMUNITY
End: 2022-10-31

## 2022-10-10 NOTE — PRE-PROCEDURE INSTRUCTIONS
PreOp Instructions given:   - Verbal medication information (what to hold and what to take)   - NPO guidelines   - Arrival place directions given; time to be given the day before procedure by the   [place] [address] [facility] [place]  - Bathing with antibacterial soap   - Don't wear any jewelry or bring any valuables AM of surgery   - No makeup or moisturizer to face   - No perfume/cologne, powder, lotions or aftershave   Pt. verbalized understanding.   Pt denies any h/o Anesthesia/Sedation complications or side effects.

## 2022-10-10 NOTE — TELEPHONE ENCOUNTER
----- Message from Madelin Helms sent at 10/10/2022  8:32 AM CDT -----  Regarding: pt advice  Contact: pt. [phone]  Pt is calling with questions about the procedure that is having on 10/11. Please call

## 2022-10-10 NOTE — TELEPHONE ENCOUNTER
Called pt to confirm arrival time of 9:15am for procedure on 10/11/22. Gave pt NPO instructions and gave pt opportunity to ask questions. Pt verbalized understanding.     Pt was informed that only 1 person would be allowed to accompany them the morning of surgery.  Pt verbalized understanding.

## 2022-10-11 ENCOUNTER — HOSPITAL ENCOUNTER (OUTPATIENT)
Facility: HOSPITAL | Age: 62
Discharge: HOME | End: 2022-10-11
Attending: UROLOGY | Admitting: UROLOGY
Payer: OTHER GOVERNMENT

## 2022-10-11 ENCOUNTER — ANESTHESIA (OUTPATIENT)
Dept: SURGERY | Facility: HOSPITAL | Age: 62
End: 2022-10-11
Payer: OTHER GOVERNMENT

## 2022-10-11 ENCOUNTER — ANESTHESIA EVENT (OUTPATIENT)
Dept: SURGERY | Facility: HOSPITAL | Age: 62
End: 2022-10-11
Payer: OTHER GOVERNMENT

## 2022-10-11 VITALS
WEIGHT: 170 LBS | TEMPERATURE: 98 F | HEART RATE: 61 BPM | HEIGHT: 64 IN | RESPIRATION RATE: 16 BRPM | SYSTOLIC BLOOD PRESSURE: 147 MMHG | OXYGEN SATURATION: 99 % | DIASTOLIC BLOOD PRESSURE: 68 MMHG | BODY MASS INDEX: 29.02 KG/M2

## 2022-10-11 DIAGNOSIS — N30.10 INTERSTITIAL CYSTITIS: Primary | ICD-10-CM

## 2022-10-11 PROCEDURE — 52260 PR CYSTOSCOPY,DIL BLADDER,GEN ANESTH: ICD-10-PCS | Mod: ,,, | Performed by: UROLOGY

## 2022-10-11 PROCEDURE — 71000015 HC POSTOP RECOV 1ST HR: Performed by: UROLOGY

## 2022-10-11 PROCEDURE — D9220A PRA ANESTHESIA: ICD-10-PCS | Mod: ANES,,, | Performed by: ANESTHESIOLOGY

## 2022-10-11 PROCEDURE — 00910 ANES TRANSURETHRAL PX NOS: CPT | Performed by: UROLOGY

## 2022-10-11 PROCEDURE — 63600175 PHARM REV CODE 636 W HCPCS: Performed by: NURSE ANESTHETIST, CERTIFIED REGISTERED

## 2022-10-11 PROCEDURE — D9220A PRA ANESTHESIA: ICD-10-PCS | Mod: CRNA,,, | Performed by: NURSE ANESTHETIST, CERTIFIED REGISTERED

## 2022-10-11 PROCEDURE — 36000707: Performed by: UROLOGY

## 2022-10-11 PROCEDURE — 37000008 HC ANESTHESIA 1ST 15 MINUTES: Performed by: UROLOGY

## 2022-10-11 PROCEDURE — 52260 CYSTOSCOPY AND TREATMENT: CPT | Mod: ,,, | Performed by: UROLOGY

## 2022-10-11 PROCEDURE — 25000003 PHARM REV CODE 250: Performed by: UROLOGY

## 2022-10-11 PROCEDURE — D9220A PRA ANESTHESIA: Mod: ANES,,, | Performed by: ANESTHESIOLOGY

## 2022-10-11 PROCEDURE — 71000044 HC DOSC ROUTINE RECOVERY FIRST HOUR: Performed by: UROLOGY

## 2022-10-11 PROCEDURE — D9220A PRA ANESTHESIA: Mod: CRNA,,, | Performed by: NURSE ANESTHETIST, CERTIFIED REGISTERED

## 2022-10-11 PROCEDURE — 36000706: Performed by: UROLOGY

## 2022-10-11 PROCEDURE — 25000003 PHARM REV CODE 250: Performed by: NURSE ANESTHETIST, CERTIFIED REGISTERED

## 2022-10-11 PROCEDURE — 37000009 HC ANESTHESIA EA ADD 15 MINS: Performed by: UROLOGY

## 2022-10-11 RX ORDER — LIDOCAINE HYDROCHLORIDE 20 MG/ML
JELLY TOPICAL
Status: DISCONTINUED | OUTPATIENT
Start: 2022-10-11 | End: 2022-10-11 | Stop reason: HOSPADM

## 2022-10-11 RX ORDER — FENTANYL CITRATE 50 UG/ML
INJECTION, SOLUTION INTRAMUSCULAR; INTRAVENOUS
Status: DISCONTINUED | OUTPATIENT
Start: 2022-10-11 | End: 2022-10-11

## 2022-10-11 RX ORDER — LIDOCAINE HYDROCHLORIDE 10 MG/ML
INJECTION, SOLUTION EPIDURAL; INFILTRATION; INTRACAUDAL; PERINEURAL
Status: DISCONTINUED
Start: 2022-10-11 | End: 2022-10-11 | Stop reason: HOSPADM

## 2022-10-11 RX ORDER — BUPIVACAINE HYDROCHLORIDE 2.5 MG/ML
INJECTION, SOLUTION EPIDURAL; INFILTRATION; INTRACAUDAL
Status: DISCONTINUED
Start: 2022-10-11 | End: 2022-10-11 | Stop reason: HOSPADM

## 2022-10-11 RX ORDER — CEFAZOLIN SODIUM 1 G/3ML
INJECTION, POWDER, FOR SOLUTION INTRAMUSCULAR; INTRAVENOUS
Status: DISCONTINUED | OUTPATIENT
Start: 2022-10-11 | End: 2022-10-11

## 2022-10-11 RX ORDER — LIDOCAINE HYDROCHLORIDE 20 MG/ML
INJECTION INTRAVENOUS
Status: DISCONTINUED | OUTPATIENT
Start: 2022-10-11 | End: 2022-10-11

## 2022-10-11 RX ORDER — ONDANSETRON 2 MG/ML
INJECTION INTRAMUSCULAR; INTRAVENOUS
Status: DISCONTINUED | OUTPATIENT
Start: 2022-10-11 | End: 2022-10-11

## 2022-10-11 RX ORDER — BUPIVACAINE HCL/EPINEPHRINE 0.25-.0005
VIAL (ML) INJECTION
Status: DISCONTINUED
Start: 2022-10-11 | End: 2022-10-11 | Stop reason: WASHOUT

## 2022-10-11 RX ORDER — DEXAMETHASONE SODIUM PHOSPHATE 4 MG/ML
INJECTION, SOLUTION INTRA-ARTICULAR; INTRALESIONAL; INTRAMUSCULAR; INTRAVENOUS; SOFT TISSUE
Status: DISCONTINUED | OUTPATIENT
Start: 2022-10-11 | End: 2022-10-11

## 2022-10-11 RX ORDER — CEFAZOLIN SODIUM/WATER 2 G/20 ML
2 SYRINGE (ML) INTRAVENOUS
Status: ACTIVE | OUTPATIENT
Start: 2022-10-11

## 2022-10-11 RX ORDER — BUPIVACAINE HYDROCHLORIDE 2.5 MG/ML
INJECTION, SOLUTION EPIDURAL; INFILTRATION; INTRACAUDAL
Status: DISCONTINUED | OUTPATIENT
Start: 2022-10-11 | End: 2022-10-11 | Stop reason: HOSPADM

## 2022-10-11 RX ORDER — MIDAZOLAM HYDROCHLORIDE 1 MG/ML
INJECTION, SOLUTION INTRAMUSCULAR; INTRAVENOUS
Status: DISCONTINUED | OUTPATIENT
Start: 2022-10-11 | End: 2022-10-11

## 2022-10-11 RX ORDER — PROPOFOL 10 MG/ML
VIAL (ML) INTRAVENOUS
Status: DISCONTINUED | OUTPATIENT
Start: 2022-10-11 | End: 2022-10-11

## 2022-10-11 RX ORDER — PHENAZOPYRIDINE HYDROCHLORIDE 100 MG/1
100 TABLET, FILM COATED ORAL 3 TIMES DAILY PRN
Qty: 30 TABLET | Refills: 0 | Status: SHIPPED | OUTPATIENT
Start: 2022-10-11 | End: 2022-10-21

## 2022-10-11 RX ADMIN — FENTANYL CITRATE 25 MCG: 50 INJECTION, SOLUTION INTRAMUSCULAR; INTRAVENOUS at 10:10

## 2022-10-11 RX ADMIN — CEFAZOLIN 2 G: 330 INJECTION, POWDER, FOR SOLUTION INTRAMUSCULAR; INTRAVENOUS at 10:10

## 2022-10-11 RX ADMIN — DEXAMETHASONE SODIUM PHOSPHATE 4 MG: 4 INJECTION, SOLUTION INTRAMUSCULAR; INTRAVENOUS at 10:10

## 2022-10-11 RX ADMIN — LIDOCAINE HYDROCHLORIDE 100 MG: 20 INJECTION INTRAVENOUS at 10:10

## 2022-10-11 RX ADMIN — PROPOFOL 50 MG: 10 INJECTION, EMULSION INTRAVENOUS at 10:10

## 2022-10-11 RX ADMIN — PROPOFOL 140 MG: 10 INJECTION, EMULSION INTRAVENOUS at 10:10

## 2022-10-11 RX ADMIN — SODIUM CHLORIDE: 9 INJECTION, SOLUTION INTRAVENOUS at 10:10

## 2022-10-11 RX ADMIN — MIDAZOLAM HYDROCHLORIDE 2 MG: 1 INJECTION, SOLUTION INTRAMUSCULAR; INTRAVENOUS at 10:10

## 2022-10-11 RX ADMIN — ONDANSETRON 4 MG: 2 INJECTION INTRAMUSCULAR; INTRAVENOUS at 10:10

## 2022-10-11 NOTE — ANESTHESIA POSTPROCEDURE EVALUATION
Anesthesia Post Evaluation    Patient: Tosin Toscano    Procedure(s) Performed: Procedure(s):  CYSTOSCOPY  INSTILLATION, BLADDER  HYDRODISTENTION    Final Anesthesia Type: general      Patient location during evaluation: PACU  Patient participation: Yes- Able to Participate  Level of consciousness: awake and alert and oriented  Post-procedure vital signs: reviewed and stable  Pain management: adequate  Airway patency: patent    PONV status at discharge: No PONV  Anesthetic complications: no      Cardiovascular status: stable  Respiratory status: unassisted, spontaneous ventilation and room air  Hydration status: euvolemic  Follow-up not needed.          Vitals Value Taken Time   /68 10/11/22 1200   Temp 36.8 °C (98.2 °F) 10/11/22 1200   Pulse 61 10/11/22 1200   Resp 16 10/11/22 1200   SpO2 99 % 10/11/22 1200         No case tracking events are documented in the log.      Pain/Eliana Score: Eliana Score: 10 (10/11/2022 12:00 PM)

## 2022-10-11 NOTE — OP NOTE
[facility]  Operative Note    Date: 10/11/2022    Pre-Op Diagnosis:   - interstitial cystitis  Patient Active Problem List    Diagnosis Date Noted    Primary osteoarthritis of right hip 06/21/2022    Nonobstructive atherosclerosis of coronary artery, mild by angio 12/21 03/15/2022    Chronic fatigue syndrome 03/02/2022    Neuralgia of right sciatic nerve 03/02/2022    Vitamin D deficiency 03/02/2022    ASCUS of cervix with negative high risk HPV 12/28/2021    Abnormal stress test     Right sided sciatica 11/15/2021    Interstitial cystitis 06/29/2021    Family history of colon cancer 01/07/2020    Essential hypertension 08/01/2019    Fibroadenoma of breast 08/01/2019    Galactorrhea in female 08/01/2019    Hot flashes 08/01/2019    Increased frequency of urination 08/01/2019    Menopause 08/01/2019    Mixed hyperlipidemia 08/01/2019    Anxiety disorder 11/05/2014    Former smoker 11/05/2014    Hypothyroidism 11/05/2014     Post-Op Diagnosis: same    Procedure(s) Performed:   1.  Cystoscopy with hydrodistension    Specimen(s): none    Staff Surgeon: Lia Resendiz MD    Assistant Surgeon: Ag Hobson MD    Anesthesia: General mask inhalational anesthesia    Indications: Tosin Toscano is a 62 y.o. female with interstitial cystitis presenting for hydrodistension.    Findings:   -Bladder capacity was 600cc initially, and then upon second distention was found to be 650cc  -Terminal hematuria noted  -Glomularizations seen predominantly within the posterior and right later bladder wall, though extended to the anterior and left lateral walls with 25% involvement of total bladder surface    Estimated Blood Loss: min    Drains: none    Procedure in detail: After risks, benefits and possible complications of hydro distention were discussed with the patient informed consent was obtained. All questions were answered in the pre-operative area.  The patient was transferred to the cystoscopy suite and placed in  the supine position.  SCDs were applied and working.  Anesthesia was administered.  After adequate anesthesia the patient was placed in the dorsal lithotomy position and prepped and draped in the usual sterile fashion. Time out was preformed and leonard-procedural antibiotics were confirmed.    A rigid cystoscope in a 22 Fr sheath was introduced into the bladder per urethra. This passed easily. The entire urethra was visualized which showed no masses or strictures. The right and left ureteral orifices were identified in the normal anatomic position. Formal cystoscopy was performed, which revealed no masses or lesions suspicious for malignancy, no trabeculations, no bladder stones and no bladder diverticuli.     The bladder was then filled with sterile water until equilibrium was reached at 80 cm of water. The capacity of the bladder was 600cc. This was held for 8 minutes. This was repeated and the second bladder capacity was 650cc. There was terminal hematuria seen. There were no ulcerations seen. There were glomerulations seen primarily along the right lateral and posterior bladder wall with involvement of 25% of total bladder surface.    The bladder was drained and the cystoscope removed.  A 16 Fr field catheter was placed.  A 40mL concoction of 1 % lidocaine and 0.5% bupivicaine was instilled into the bladder with instructions to keep the instillation in the bladder for at least 30 minutes postoperatively.    The patient was removed from lithotomy and transferred to recovery in stable condition.    Disposition:  The patient will follow up with Dr. Resendiz in 2 weeks.     MD HEMALATHA Chairez was present for the entire case and agree with the above note.

## 2022-10-11 NOTE — TRANSFER OF CARE
"Anesthesia Transfer of Care Note    Patient: Tosin Toscano    Procedure(s) Performed: Procedure(s):  CYSTOSCOPY  INSTILLATION, BLADDER  HYDRODISTENTION    Patient location: PACU    Anesthesia Type: general    Transport from OR: Transported from OR on 6-10 L/min O2 by face mask with adequate spontaneous ventilation    Post pain: adequate analgesia    Post assessment: no apparent anesthetic complications    Post vital signs: stable    Level of consciousness: awake    Nausea/Vomiting: no nausea/vomiting    Complications: none    Transfer of care protocol was followed      Last vitals:   Visit Vitals  /72 (BP Location: Left arm, Patient Position: Lying)   Pulse (!) 54   Temp 36.8 °C (98.2 °F) (Skin)   Resp 16   Ht 5' 4" (1.626 m)   Wt 77.1 kg (170 lb)   SpO2 99%   Breastfeeding No   BMI 29.18 kg/m²     "

## 2022-10-11 NOTE — SUBJECTIVE & OBJECTIVE
Past Medical History:   Diagnosis Date    Anticoagulant long-term use     Arthritis     Cystitis     interstitial cystitis    Generalized anxiety disorder     Hypertension     Mixed hyperlipidemia     Renal disorder     interstitial cystitis    Sleep apnea in adult 2022    mild case  ---don't use a C-PAP    Thyroid disease        Past Surgical History:   Procedure Laterality Date    BREAST BIOPSY Left     BREAST CYST ASPIRATION       SECTION      COLONOSCOPY      COLONOSCOPY N/A 2020    Procedure: COLONOSCOPY;  Surgeon: Tammy Suggs MD;  Location: [facility] ENDO;  Service: Endoscopy;  Laterality: N/A;    LEFT HEART CATHETERIZATION Left 2021    Procedure: Left heart cath;  Surgeon: Montserrat Rodriguez MD;  Location: [facility] CATH;  Service: Cardiology;  Laterality: Left;    PILONIDAL CYST DRAINAGE      ROBOTIC ARTHROPLASTY, HIP Right 2022    Procedure: ROBOTIC ARTHROPLASTY,HIP;  Surgeon: Kermit Rao MD;  Location: [facility] OR;  Service: Orthopedics;  Laterality: Right;       Review of patient's allergies indicates:   Allergen Reactions    Amlodipine      Anxiety, depression, insomina, costipation       Family History       Problem Relation (Age of Onset)    Cancer Brother    Colon cancer Mother    Coronary artery disease Brother    Diabetes Brother            Tobacco Use    Smoking status: Former     Packs/day: 0.00     Years: 30.00     Pack years: 0.00     Types: Cigarettes     Quit date: 2018     Years since quittin.1    Smokeless tobacco: Never   Substance and Sexual Activity    Alcohol use: Yes     Comment: occ    Drug use: Never    Sexual activity: Not on file       Review of Systems   Constitutional:  Negative for chills and fever.   HENT:  Negative for sore throat and trouble swallowing.    Eyes:  Negative for pain and discharge.   Respiratory:  Negative for shortness of breath and wheezing.    Cardiovascular:  Negative for chest pain and palpitations.   Gastrointestinal:   Negative for abdominal pain, diarrhea, nausea and vomiting.   Genitourinary:         As per HPI   Musculoskeletal:  Negative for back pain and joint swelling.   Skin:  Negative for rash and wound.   Neurological:  Negative for seizures, weakness and headaches.   Psychiatric/Behavioral:  Negative for hallucinations. The patient is not nervous/anxious.      Objective:     Temp:  [98.2 °F (36.8 °C)] 98.2 °F (36.8 °C)  Pulse:  [54] 54  Resp:  [16] 16  SpO2:  [99 %] 99 %  BP: (137)/(72) 137/72     Body mass index is 29.18 kg/m².    No intake/output data recorded.       Drains       None                   Physical Exam  Vitals and nursing note reviewed.   Constitutional:       General: She is not in acute distress.  HENT:      Head: Atraumatic.      Nose: Nose normal.   Eyes:      Extraocular Movements: Extraocular movements intact.   Cardiovascular:      Rate and Rhythm: Normal rate.   Pulmonary:      Effort: Pulmonary effort is normal.   Abdominal:      General: Abdomen is flat.      Tenderness: There is no right CVA tenderness or left CVA tenderness.   Musculoskeletal:         General: Normal range of motion.      Cervical back: Normal range of motion.   Neurological:      General: No focal deficit present.      Mental Status: She is alert. Mental status is at baseline.   Psychiatric:         Mood and Affect: Mood normal.         Behavior: Behavior normal.         Thought Content: Thought content normal.         Judgment: Judgment normal.       Significant Labs:    BMP:  Recent Labs   Lab 10/05/22  0752     142   K 4.9  4.9     103   CO2 27  27   BUN 16  16   CREATININE 0.67  0.67   CALCIUM 9.7  9.7       CBC:  Recent Labs   Lab 10/05/22  0752   WBC 5.22   HGB 13.5   HCT 41.5          All pertinent labs results from the past 24 hours have been reviewed.    Significant Imaging:  All pertinent imaging results/findings from the past 24 hours have been reviewed.

## 2022-10-11 NOTE — ANESTHESIA PREPROCEDURE EVALUATION
10/11/2022  Tosin Toscano is a 62 y.o., female.        Procedure: CYSTOSCOPY, WITH BLADDER BIOPSY, WITH FULGURATION IF INDICATED - 30min   Anesthesia type: Monitor Anesthesia Care   Diagnosis: Chronic interstitial cystitis [N30.10]         Pre-operative evaluation for Procedure(s) (LRB):  CYSTOSCOPY, WITH BLADDER BIOPSY, WITH FULGURATION IF INDICATED (N/A)    @[id number]@@    Encounter Diagnosis   Name Primary?    Interstitial cystitis        Review of patient's allergies indicates:   Allergen Reactions    Amlodipine      Anxiety, depression, insomina, costipation       Medications Prior to Admission   Medication Sig Dispense Refill Last Dose    amoxicillin (AMOXIL) 500 MG Tab Take 500 mg by mouth. Take 2 tablets the pm prior to Sx and 2 tablets the am of SX   10/10/2022    levothyroxine (UNITHROID) 112 MCG tablet Take 112 mcg by mouth before breakfast.   10/10/2022    losartan (COZAAR) 100 MG tablet Take 1 tablet (100 mg total) by mouth once daily. (Patient taking differently: Take 100 mg by mouth nightly.) 90 tablet 3 10/10/2022    vitamin D (VITAMIN D3) 1000 units Tab Take 2,000 Units by mouth once daily.   10/10/2022    acetaminophen (TYLENOL) 500 mg Cap Take 1 capsule by mouth 4 (four) times daily as needed. Not to exceed 4000mg per day.  Must account for any acetaminophen in other medications.  Take tylenol for mild to moderate pain of 1-3/10.       ALPRAZolam (XANAX) 0.25 MG tablet TAKE ONE TABLET BY MOUTH NIGHTLY AT BEDTIME 30 tablet 5     amLODIPine (NORVASC) 2.5 MG tablet Take 1 tablet (2.5 mg total) by mouth once daily. (Patient not taking: Reported on 8/25/2022) 30 tablet 0     aspirin (ECOTRIN) 81 MG EC tablet Take 1 tablet (81 mg total) by mouth once daily. 90 tablet 3     atorvastatin (LIPITOR) 80 MG tablet Take 1 tablet (80 mg total) by mouth once daily. (Patient taking differently:  Take 80 mg by mouth every evening.) 90 tablet 3     CALCIUM-MAGNESIUM ORAL Take 3 tablets by mouth once daily.       cyclobenzaprine (FLEXERIL) 10 MG tablet Take 10 mg by mouth 3 (three) times daily.       levothyroxine (SYNTHROID) 125 MCG tablet Take 1 tablet (125 mcg total) by mouth before breakfast. (Patient taking differently: Take 125 mcg by mouth before breakfast. Not taking on wednesdays and saturdays) 30 tablet 0     senna-docusate 8.6-50 mg (PERICOLACE) 8.6-50 mg per tablet Take 1 tablet by mouth 2 (two) times daily as needed for Constipation.       vibegron (GEMTESA) 75 mg Tab Take 75 mg by mouth once daily. (Patient not taking: No sig reported) 30 tablet 11             Current Facility-Administered Medications on File Prior to Encounter   Medication Dose Route Frequency Provider Last Rate Last Admin    sodium chloride 0.9% flush 10 mL  10 mL Intravenous PRN Tammy Suggs MD         Current Outpatient Medications on File Prior to Encounter   Medication Sig Dispense Refill    amoxicillin (AMOXIL) 500 MG Tab Take 500 mg by mouth. Take 2 tablets the pm prior to Sx and 2 tablets the am of SX      levothyroxine (UNITHROID) 112 MCG tablet Take 112 mcg by mouth before breakfast.      losartan (COZAAR) 100 MG tablet Take 1 tablet (100 mg total) by mouth once daily. (Patient taking differently: Take 100 mg by mouth nightly.) 90 tablet 3    vitamin D (VITAMIN D3) 1000 units Tab Take 2,000 Units by mouth once daily.      acetaminophen (TYLENOL) 500 mg Cap Take 1 capsule by mouth 4 (four) times daily as needed. Not to exceed 4000mg per day.  Must account for any acetaminophen in other medications.  Take tylenol for mild to moderate pain of 1-3/10.      ALPRAZolam (XANAX) 0.25 MG tablet TAKE ONE TABLET BY MOUTH NIGHTLY AT BEDTIME 30 tablet 5    amLODIPine (NORVASC) 2.5 MG tablet Take 1 tablet (2.5 mg total) by mouth once daily. (Patient not taking: Reported on 8/25/2022) 30 tablet 0    aspirin  (ECOTRIN) 81 MG EC tablet Take 1 tablet (81 mg total) by mouth once daily. 90 tablet 3    atorvastatin (LIPITOR) 80 MG tablet Take 1 tablet (80 mg total) by mouth once daily. (Patient taking differently: Take 80 mg by mouth every evening.) 90 tablet 3    CALCIUM-MAGNESIUM ORAL Take 3 tablets by mouth once daily.      cyclobenzaprine (FLEXERIL) 10 MG tablet Take 10 mg by mouth 3 (three) times daily.      levothyroxine (SYNTHROID) 125 MCG tablet Take 1 tablet (125 mcg total) by mouth before breakfast. (Patient taking differently: Take 125 mcg by mouth before breakfast. Not taking on  and ) 30 tablet 0    senna-docusate 8.6-50 mg (PERICOLACE) 8.6-50 mg per tablet Take 1 tablet by mouth 2 (two) times daily as needed for Constipation.      vibegron (GEMTESA) 75 mg Tab Take 75 mg by mouth once daily. (Patient not taking: No sig reported) 30 tablet 11       Past Medical History:  No date: Anticoagulant long-term use  No date: Arthritis  No date: Cystitis      Comment:  interstitial cystitis  No date: Generalized anxiety disorder  No date: Hypertension  No date: Mixed hyperlipidemia  No date: Renal disorder      Comment:  interstitial cystitis  2022: Sleep apnea in adult      Comment:  mild case  ---don't use a C-PAP  No date: Thyroid disease    Past Surgical History:   Procedure Laterality Date    BREAST BIOPSY Left     BREAST CYST ASPIRATION       SECTION      COLONOSCOPY      COLONOSCOPY N/A 2020    Procedure: COLONOSCOPY;  Surgeon: Tammy Suggs MD;  Location: [facility] ENDO;  Service: Endoscopy;  Laterality: N/A;    LEFT HEART CATHETERIZATION Left 2021    Procedure: Left heart cath;  Surgeon: Montserrat Rodriguez MD;  Location: [facility] CATH;  Service: Cardiology;  Laterality: Left;    PILONIDAL CYST DRAINAGE      ROBOTIC ARTHROPLASTY, HIP Right 2022    Procedure: ROBOTIC ARTHROPLASTY,HIP;  Surgeon: Kermit Rao MD;  Location: [facility] OR;  Service: Orthopedics;   Laterality: Right;       Social History     Tobacco Use   Smoking Status Former    Packs/day: 0.00    Years: 30.00    Pack years: 0.00    Types: Cigarettes    Quit date: 2018    Years since quittin.1   Smokeless Tobacco Never       Social History     Substance and Sexual Activity   Alcohol Use Yes    Comment: occ       Physical Activity: Not on file         No results for input(s): HCT in the last 72 hours.  No results for input(s): PLT in the last 72 hours.  No results for input(s): K in the last 72 hours.  No results for input(s): CREATININE in the last 72 hours.  No results for input(s): GLU in the last 72 hours.  No results for input(s): PT in the last 72 hours.                    Pre-op Assessment          Review of Systems  Anesthesia Hx:  No problems with previous Anesthesia    Hematology/Oncology:  Hematology Normal   Oncology Normal     Cardiovascular:   Hypertension, well controlled Denies MI.    Denies Angina.    Pulmonary:   Denies COPD.  Denies Asthma.  Denies Shortness of breath. Sleep Apnea    Education provided regarding risk of obstructive sleep apnea     Renal/:   Denies Chronic Renal Disease.     Hepatic/GI:   Denies Liver Disease.    Neurological:   Denies TIA. Denies CVA. Denies Seizures.    Endocrine:   Denies Diabetes.        Physical Exam  General: Well nourished, Cooperative, Alert and Oriented    Airway:  Mallampati: II   Mouth Opening: Normal  TM Distance: Normal  Tongue: Normal  Neck ROM: Normal ROM        Anesthesia Plan  Type of Anesthesia, risks & benefits discussed:    Anesthesia Type: Gen Natural Airway  Intra-op Monitoring Plan: Standard ASA Monitors  Post Op Pain Control Plan: IV/PO Opioids PRN  Induction:  IV  Informed Consent: Informed consent signed with the Patient and all parties understand the risks and agree with anesthesia plan.  All questions answered.   ASA Score: 2  Day of Surgery Review of History & Physical: H&P Update referred to the  surgeon/provider.    Ready For Surgery From Anesthesia Perspective.     .  C12/21/2021    Non-obstructive CAD

## 2022-10-11 NOTE — H&P
[facility] - [facility] ([facility])  Urology  History & Physical    Patient Name: Tosin Toscano  MRN: [id number]  Admission Date: 10/11/2022  Code Status: Prior   Attending Provider: Lia Resendiz MD   Primary Care Physician: Kaylee Fishman MD  Principal Problem:<principal problem not specified>    Subjective:     HPI:  Tosin Toscano is a 61 y.o. female who states she has a 27 year history of IC.  She was diagnosed with Dr. Brewer on the [facility].  She has most recently been seen by Dr. Jones at [facility], whom she states balked at the idea of using Rimso.  She has had good results with it for the pelvic pain associated with a full bladder and the urgency frequency.       She states when she was first diagnosed, she was treated like this was all in her head.  She saw a number of doctors (underwent cystoscopy, diagnostic laparoscopy, IVP)  then saw a herbalist in [facility] who offered her therapeutic teas and this helped a lot.  However, when she went back to him recently, he was compounding the medication into gummie candies and this did not seem to work as well.  She was placed on Toviaz and hyoscyamine which she could not tolerate due to dry eyes.  At this point, she has discomfort with her bladder full, relieved when she voids, she has constant urgency (PVR today was 14 by bladder scan) frequency.       She is scheduled to have her right hip replaced and does not wish to have anything jeopardize her surgery.       , uterus in place, no pain with intercourse or days after, bowels are normal     At her last visit with us she had instillation of RIMSO.     Urine dipstick - specific gravity 1.015, pH 5. Negative for all remaining components.        Past Medical History:   Diagnosis Date    Anticoagulant long-term use     Arthritis     Cystitis     interstitial cystitis    Generalized anxiety disorder     Hypertension     Mixed hyperlipidemia     Renal disorder     interstitial cystitis    Sleep apnea in adult  2022    mild case  ---don't use a C-PAP    Thyroid disease        Past Surgical History:   Procedure Laterality Date    BREAST BIOPSY Left     BREAST CYST ASPIRATION       SECTION      COLONOSCOPY      COLONOSCOPY N/A 2020    Procedure: COLONOSCOPY;  Surgeon: Tammy Suggs MD;  Location: [facility] ENDO;  Service: Endoscopy;  Laterality: N/A;    LEFT HEART CATHETERIZATION Left 2021    Procedure: Left heart cath;  Surgeon: Montserrat Rodriguez MD;  Location: [facility] CATH;  Service: Cardiology;  Laterality: Left;    PILONIDAL CYST DRAINAGE      ROBOTIC ARTHROPLASTY, HIP Right 2022    Procedure: ROBOTIC ARTHROPLASTY,HIP;  Surgeon: Kermit Rao MD;  Location: [facility] OR;  Service: Orthopedics;  Laterality: Right;       Review of patient's allergies indicates:   Allergen Reactions    Amlodipine      Anxiety, depression, insomina, costipation       Family History       Problem Relation (Age of Onset)    Cancer Brother    Colon cancer Mother    Coronary artery disease Brother    Diabetes Brother            Tobacco Use    Smoking status: Former     Packs/day: 0.00     Years: 30.00     Pack years: 0.00     Types: Cigarettes     Quit date: 2018     Years since quittin.1    Smokeless tobacco: Never   Substance and Sexual Activity    Alcohol use: Yes     Comment: occ    Drug use: Never    Sexual activity: Not on file       Review of Systems   Constitutional:  Negative for chills and fever.   HENT:  Negative for sore throat and trouble swallowing.    Eyes:  Negative for pain and discharge.   Respiratory:  Negative for shortness of breath and wheezing.    Cardiovascular:  Negative for chest pain and palpitations.   Gastrointestinal:  Negative for abdominal pain, diarrhea, nausea and vomiting.   Genitourinary:         As per HPI   Musculoskeletal:  Negative for back pain and joint swelling.   Skin:  Negative for rash and wound.   Neurological:  Negative for seizures, weakness and headaches.    Psychiatric/Behavioral:  Negative for hallucinations. The patient is not nervous/anxious.      Objective:     Temp:  [98.2 °F (36.8 °C)] 98.2 °F (36.8 °C)  Pulse:  [54] 54  Resp:  [16] 16  SpO2:  [99 %] 99 %  BP: (137)/(72) 137/72     Body mass index is 29.18 kg/m².    No intake/output data recorded.       Drains       None                   Physical Exam  Vitals and nursing note reviewed.   Constitutional:       General: She is not in acute distress.  HENT:      Head: Atraumatic.      Nose: Nose normal.   Eyes:      Extraocular Movements: Extraocular movements intact.   Cardiovascular:      Rate and Rhythm: Normal rate.   Pulmonary:      Effort: Pulmonary effort is normal.   Abdominal:      General: Abdomen is flat.      Tenderness: There is no right CVA tenderness or left CVA tenderness.   Musculoskeletal:         General: Normal range of motion.      Cervical back: Normal range of motion.   Neurological:      General: No focal deficit present.      Mental Status: She is alert. Mental status is at baseline.   Psychiatric:         Mood and Affect: Mood normal.         Behavior: Behavior normal.         Thought Content: Thought content normal.         Judgment: Judgment normal.       Significant Labs:    BMP:  Recent Labs   Lab 10/05/22  0752     142   K 4.9  4.9     103   CO2 27  27   BUN 16  16   CREATININE 0.67  0.67   CALCIUM 9.7  9.7       CBC:  Recent Labs   Lab 10/05/22  0752   WBC 5.22   HGB 13.5   HCT 41.5          All pertinent labs results from the past 24 hours have been reviewed.    Significant Imaging:  All pertinent imaging results/findings from the past 24 hours have been reviewed.                  Assessment and Plan:     Interstitial cystitis  - Plan for cysto with hydrodistention with bladder instillation and possible bladder biopsy and fulguration today.        VTE Risk Mitigation (From admission, onward)           Ordered     IP VTE LOW RISK PATIENT  Once          10/11/22 0943     Place EMILY hose  Until discontinued         10/11/22 0943     Place sequential compression device  Until discontinued         10/11/22 0943                    Ag Hobson MD  Urology  [facility] - Surgery (1st Fl)    Patient seen in holding.  No changes in clinical condition.  Proceed with planned procedure.

## 2022-10-11 NOTE — ASSESSMENT & PLAN NOTE
- Plan for cysto with hydrodistention with bladder instillation and possible bladder biopsy and fulguration today.

## 2022-10-11 NOTE — PATIENT INSTRUCTIONS

## 2022-10-11 NOTE — HPI
Tosin Toscano is a 61 y.o. female who states she has a 27 year history of IC.  She was diagnosed with Dr. Brewer on the [facility].  She has most recently been seen by Dr. Jones at [facility], whom she states balked at the idea of using Rimso.  She has had good results with it for the pelvic pain associated with a full bladder and the urgency frequency.       She states when she was first diagnosed, she was treated like this was all in her head.  She saw a number of doctors (underwent cystoscopy, diagnostic laparoscopy, IVP)  then saw a herbalist in [facility] who offered her therapeutic teas and this helped a lot.  However, when she went back to him recently, he was compounding the medication into gummie candies and this did not seem to work as well.  She was placed on Toviaz and hyoscyamine which she could not tolerate due to dry eyes.  At this point, she has discomfort with her bladder full, relieved when she voids, she has constant urgency (PVR today was 14 by bladder scan) frequency.       She is scheduled to have her right hip replaced and does not wish to have anything jeopardize her surgery.       , uterus in place, no pain with intercourse or days after, bowels are normal     At her last visit with us she had instillation of RIMSO.     Urine dipstick - specific gravity 1.015, pH 5. Negative for all remaining components.

## 2022-10-11 NOTE — ANESTHESIA PROCEDURE NOTES
Intubation    Date/Time: 10/11/2022 10:29 AM  Performed by: Rosaline Fuller CRNA  Authorized by: Angus Berman Jr., MD     Intubation:     Induction:  Intravenous    Intubated:  Postinduction    Attempts:  1    Attempted By:  Student    Difficult Airway Encountered?: No      Complications:  None    Airway Device:  Supraglottic airway/LMA    Airway Device Size:  4.0    Style/Cuff Inflation:  Cuffed (inflated to minimal occlusive pressure)    Secured at:  The lips    Placement Verified By:  Capnometry

## 2022-10-11 NOTE — DISCHARGE SUMMARY
[facility] - Surgery (1st Fl)  Discharge Note  Short Stay    Procedure(s):  CYSTOSCOPY  INSTILLATION, BLADDER  HYDRODISTENTION      OUTCOME: Patient tolerated treatment/procedure well without complication and is now ready for discharge.    DISPOSITION: Home or Self Care    FINAL DIAGNOSIS:  Interstitial cystitis    FOLLOWUP: In clinic    DISCHARGE INSTRUCTIONS:    Discharge Procedure Orders   No dressing needed     Notify your health care provider if you experience any of the following:  temperature >100.4     Notify your health care provider if you experience any of the following:  persistent nausea and vomiting or diarrhea     Notify your health care provider if you experience any of the following:  severe uncontrolled pain     Notify your health care provider if you experience any of the following:  difficulty breathing or increased cough     Notify your health care provider if you experience any of the following:  severe persistent headache     Notify your health care provider if you experience any of the following:  worsening rash     Notify your health care provider if you experience any of the following:  persistent dizziness, light-headedness, or visual disturbances     Activity as tolerated        TIME SPENT ON DISCHARGE: 15 minutes

## 2022-10-31 ENCOUNTER — OFFICE VISIT (OUTPATIENT)
Dept: UROLOGY | Facility: CLINIC | Age: 62
End: 2022-10-31
Payer: OTHER GOVERNMENT

## 2022-10-31 VITALS
WEIGHT: 181.69 LBS | DIASTOLIC BLOOD PRESSURE: 89 MMHG | HEART RATE: 71 BPM | SYSTOLIC BLOOD PRESSURE: 150 MMHG | HEIGHT: 64 IN | BODY MASS INDEX: 31.02 KG/M2

## 2022-10-31 DIAGNOSIS — R35.0 URINARY FREQUENCY: ICD-10-CM

## 2022-10-31 DIAGNOSIS — Z98.890 POST-OPERATIVE STATE: Primary | ICD-10-CM

## 2022-10-31 DIAGNOSIS — N30.10 INTERSTITIAL CYSTITIS: ICD-10-CM

## 2022-10-31 DIAGNOSIS — R39.15 URINARY URGENCY: ICD-10-CM

## 2022-10-31 PROCEDURE — 99999 PR PBB SHADOW E&M-EST. PATIENT-LVL III: ICD-10-PCS | Mod: PBBFAC,,, | Performed by: UROLOGY

## 2022-10-31 PROCEDURE — 99215 OFFICE O/P EST HI 40 MIN: CPT | Mod: S$PBB,,, | Performed by: UROLOGY

## 2022-10-31 PROCEDURE — 99213 OFFICE O/P EST LOW 20 MIN: CPT | Mod: PBBFAC | Performed by: UROLOGY

## 2022-10-31 PROCEDURE — 99215 PR OFFICE/OUTPT VISIT, EST, LEVL V, 40-54 MIN: ICD-10-PCS | Mod: S$PBB,,, | Performed by: UROLOGY

## 2022-10-31 PROCEDURE — 81002 URINALYSIS NONAUTO W/O SCOPE: CPT | Mod: PBBFAC | Performed by: UROLOGY

## 2022-10-31 PROCEDURE — 99999 PR PBB SHADOW E&M-EST. PATIENT-LVL III: CPT | Mod: PBBFAC,,, | Performed by: UROLOGY

## 2022-10-31 RX ORDER — LISINOPRIL 40 MG/1
40 TABLET ORAL DAILY
COMMUNITY
End: 2023-01-17 | Stop reason: SDUPTHER

## 2022-10-31 RX ORDER — LEVOTHYROXINE SODIUM 112 UG/1
TABLET ORAL
COMMUNITY
Start: 2022-10-07 | End: 2023-01-17

## 2022-10-31 RX ORDER — MUPIROCIN 20 MG/G
OINTMENT TOPICAL
COMMUNITY
Start: 2022-10-20 | End: 2023-01-17

## 2022-10-31 RX ORDER — VIBEGRON 75 MG/1
75 TABLET, FILM COATED ORAL DAILY
Qty: 30 TABLET | Refills: 11 | Status: SHIPPED | OUTPATIENT
Start: 2022-10-31 | End: 2023-01-17

## 2022-10-31 NOTE — PROGRESS NOTES
CHIEF COMPLAINT:    Mrs. Toscano is a 62 y.o. female presenting for a follow up after cystoscopy, hydrodistension for IC  PRESENTING ILLNESS:    Tosin Toscano is a 62 y.o. female who is presents for follow up after the above.  She does not really have any pain issues, mostly the urgency, frequency and ncoturia.  Nocturia from 7 to 2-3. Frequency improved by 25% to 33%.  Urgency not as severe, able to cross the [place].  Though she has had some improvement, she states she is not where she would like to be.  She recalls that she had much more relief after the DMSO years ago but she really suffered after the treatment in May.  She was not sure if she wanted another.  She is averse to taking medication but she relates it to the antimuscarinics she took in the past.     Allergies:  Amlodipine    Medications:  Outpatient Encounter Medications as of 10/31/2022   Medication Sig Dispense Refill    ALPRAZolam (XANAX) 0.25 MG tablet TAKE ONE TABLET BY MOUTH NIGHTLY AT BEDTIME 30 tablet 5    aspirin (ECOTRIN) 81 MG EC tablet Take 1 tablet (81 mg total) by mouth once daily. 90 tablet 3    aspirin 81 mg Cap aspirin   81mg      atorvastatin (LIPITOR) 80 MG tablet Take 1 tablet (80 mg total) by mouth once daily. (Patient taking differently: Take 80 mg by mouth every evening.) 90 tablet 3    CALCIUM-MAGNESIUM ORAL Take 3 tablets by mouth once daily.      levothyroxine (SYNTHROID) 112 MCG tablet       lisinopriL (PRINIVIL,ZESTRIL) 40 MG tablet lisinopril 40 mg tablet      losartan (COZAAR) 100 MG tablet Take 1 tablet (100 mg total) by mouth once daily. (Patient taking differently: Take 100 mg by mouth nightly.) 90 tablet 3    mupirocin (BACTROBAN) 2 % ointment SMARTSI Application Topical 2-3 Times Daily      vitamin D (VITAMIN D3) 1000 units Tab Take 2,000 Units by mouth once daily.       Facility-Administered Encounter Medications as of 10/31/2022   Medication Dose Route Frequency Provider Last Rate Last Admin    ceFAZolin in  sterile water 2 gram/20 mL IV syringe 2,000 mg  2 g Intravenous On Call Procedure Ag Hobson MD        sodium chloride 0.9% flush 10 mL  10 mL Intravenous PRN Tammy Suggs MD             PHYSICAL EXAMINATION:    The patient generally appears in good health, is appropriately interactive, and is in no apparent distress.    Skin: No lesions.    Mental: Cooperative with normal affect.    Neuro: Grossly intact.    HEENT: Normal. No evidence of lymphadenopathy.    Chest:  normal inspiratory effort.    Abdomen: Soft, non-tender. No masses or organomegaly. Bladder is not palpable. No evidence of flank discomfort. No evidence of inguinal hernia.    Extremities: No clubbing, cyanosis, or edema    LABS:    Lab Results   Component Value Date    BUN 16 10/05/2022    BUN 16 10/05/2022    CREATININE 0.67 10/05/2022    CREATININE 0.67 10/05/2022       UA 1.005, pH 7, tr protein, otherwise, negative.     IMPRESSION:    Urgency, frequency  Interstitial cystitis  Post op state      PLAN:    1. Encouraged her to try the Gemtesa. Sent to the [facility] pharmacy to obtain the precert  2.  Follow up in 3 months  3.  Discussed that she should try the medication and see how she does, if there are side effects she can stop but beta 3 agonists have a very different side effect profile. We need to document at least a trial of oral medication before going on to 3rd line therapy.  4.  Information on the AVS for symptom tracking.     I spent 40 minutes with the patient of which more than half was spent in direct consultation with the patient in regards to our treatment and plan.

## 2022-10-31 NOTE — PATIENT INSTRUCTIONS
Interstitial cystitis association www.ichelp.org  Interstitis cystitis network www.ic-network.com  ICN Food List (This is an dee from the Interstitial Cystitis Network that is available on iOS and Android.  Downloadable to your phone for easy access when going out or at the grocery store, to help make decisions on foods which may be bladder irritants)   ICN Bladder Tracker (Free Dee from the Interstitial Cystitis Network that tracks overall wellness, voiding symptoms, and other factors which come into play with IC.  This helps to guide therapy and assess response.)

## 2023-01-23 ENCOUNTER — TELEPHONE (OUTPATIENT)
Dept: OBSTETRICS AND GYNECOLOGY | Facility: CLINIC | Age: 63
End: 2023-01-23
Payer: OTHER GOVERNMENT

## 2023-01-23 ENCOUNTER — PATIENT MESSAGE (OUTPATIENT)
Dept: PSYCHIATRY | Facility: CLINIC | Age: 63
End: 2023-01-23
Payer: OTHER GOVERNMENT

## 2023-01-23 NOTE — TELEPHONE ENCOUNTER
----- Message from Esperanza Montelongo sent at 1/23/2023 10:14 AM CST -----  Contact: MJ RENTERIA @ [phone]  Type:  Sooner Appointment Request    Caller is requesting a sooner appointment.  Caller declined first available appointment listed below.  Caller will not accept being placed on the waitlist and is requesting a message be sent to doctor.    Name of Caller:  Pt   When is the first available appointment?  N/A   Symptoms:  Pap Smear  Best Call Back Number:  [phone] (home) [phone]    Additional Information:  Patient is calling to speak with nurse/MA to schedule sooner appointment. Patient was referred by Dr Jones. Please call and advise.

## 2023-03-01 ENCOUNTER — OFFICE VISIT (OUTPATIENT)
Dept: OBSTETRICS AND GYNECOLOGY | Facility: CLINIC | Age: 63
End: 2023-03-01
Payer: OTHER GOVERNMENT

## 2023-03-01 VITALS
HEIGHT: 64 IN | SYSTOLIC BLOOD PRESSURE: 126 MMHG | DIASTOLIC BLOOD PRESSURE: 80 MMHG | RESPIRATION RATE: 14 BRPM | WEIGHT: 184.31 LBS | BODY MASS INDEX: 31.47 KG/M2

## 2023-03-01 DIAGNOSIS — Z01.419 WELL WOMAN EXAM: ICD-10-CM

## 2023-03-01 DIAGNOSIS — Z01.419 ENCOUNTER FOR ANNUAL ROUTINE GYNECOLOGICAL EXAMINATION: Primary | ICD-10-CM

## 2023-03-01 PROCEDURE — 99999 PR PBB SHADOW E&M-EST. PATIENT-LVL IV: CPT | Mod: PBBFAC,,, | Performed by: OBSTETRICS & GYNECOLOGY

## 2023-03-01 PROCEDURE — 99214 OFFICE O/P EST MOD 30 MIN: CPT | Mod: PBBFAC,PN | Performed by: OBSTETRICS & GYNECOLOGY

## 2023-03-01 PROCEDURE — 99999 PR PBB SHADOW E&M-EST. PATIENT-LVL IV: ICD-10-PCS | Mod: PBBFAC,,, | Performed by: OBSTETRICS & GYNECOLOGY

## 2023-03-01 PROCEDURE — 99386 PREV VISIT NEW AGE 40-64: CPT | Mod: S$PBB,,, | Performed by: OBSTETRICS & GYNECOLOGY

## 2023-03-01 PROCEDURE — 88175 CYTOPATH C/V AUTO FLUID REDO: CPT | Performed by: OBSTETRICS & GYNECOLOGY

## 2023-03-01 PROCEDURE — 99386 PR PREVENTIVE VISIT,NEW,40-64: ICD-10-PCS | Mod: S$PBB,,, | Performed by: OBSTETRICS & GYNECOLOGY

## 2023-03-01 RX ORDER — LOSARTAN POTASSIUM 100 MG/1
100 TABLET ORAL
COMMUNITY
Start: 2023-02-15 | End: 2023-08-18

## 2023-03-01 NOTE — PROGRESS NOTES
Chief Complaint   Patient presents with    Well Woman       History and Physical:  No LMP recorded. Patient is postmenopausal.       Tosin Orourke is a 62 y.o.  WF who presents today for her routine annual GYN exam. The patient has no Gynecology complaints today. Pap, check up      Allergies:   Review of patient's allergies indicates:   Allergen Reactions    Amlodipine      Anxiety, depression, insomina, costipation       Past Medical History:   Diagnosis Date    Anticoagulant long-term use     Arthritis     Cystitis     interstitial cystitis    Generalized anxiety disorder     Hypertension     Mixed hyperlipidemia     Renal disorder     interstitial cystitis    Sleep apnea in adult 2022    mild case  ---don't use a C-PAP    Thyroid disease        Past Surgical History:   Procedure Laterality Date    BREAST BIOPSY Left     BREAST CYST ASPIRATION       SECTION      COLONOSCOPY      COLONOSCOPY N/A 2020    Procedure: COLONOSCOPY;  Surgeon: Tammy Suggs MD;  Location: [facility];  Service: Endoscopy;  Laterality: N/A;    CYSTOSCOPY  10/11/2022    Procedure: CYSTOSCOPY;  Surgeon: Lia Resendiz MD;  Location: [address];  Service: Urology;;    INSTILLATION OF URINARY BLADDER  10/11/2022    Procedure: INSTILLATION, BLADDER;  Surgeon: Lia Resendiz MD;  Location: [facility] OR [address] [place] [address];  Service: Urology;;    LEFT HEART CATHETERIZATION Left 2021    Procedure: Left heart cath;  Surgeon: Montserrat Rodriguez MD;  Location: [facility] CATH;  Service: Cardiology;  Laterality: Left;    PILONIDAL CYST DRAINAGE      ROBOTIC ARTHROPLASTY, HIP Right 2022    Procedure: ROBOTIC ARTHROPLASTY,HIP;  Surgeon: Kermit Rao MD;  Location: [facility] OR;  Service: Orthopedics;  Laterality: Right;       MEDS:   Current Outpatient Medications on File Prior to Visit   Medication Sig Dispense Refill    albuterol (PROVENTIL/VENTOLIN HFA) 90 mcg/actuation inhaler Inhale 1 puff into the lungs every 6 to 8 hours as  needed.      ALPRAZolam (XANAX) 0.25 MG tablet TAKE ONE TABLET BY MOUTH NIGHTLY AT BEDTIME 30 tablet 1    aspirin 81 mg Cap Take 1 tablet by mouth Daily.      atorvastatin (LIPITOR) 80 MG tablet Take 1 tablet (80 mg total) by mouth once daily. 90 tablet 3    CALCIUM-MAGNESIUM ORAL Take 3 tablets by mouth once daily.      collagen-biotin-ascorbic acid (COLLAGEN 1500 PLUS C) 500 mg-800 mcg- 50 mg Cap Take 1,000 mg by mouth Daily.      lisinopriL (PRINIVIL,ZESTRIL) 40 MG tablet Take 1 tablet (40 mg total) by mouth once daily. 90 tablet 3    UNABLE TO FIND Vitex 400 mg (herbal) take 1 tablet daily      vitamin D (VITAMIN D3) 1000 units Tab Take 2,000 Units by mouth once daily.      levothyroxine (SYNTHROID) 112 MCG tablet Take 1 tablet by mouth before breakfast.      losartan (COZAAR) 100 MG tablet Take 100 mg by mouth.      montelukast (SINGULAIR) 10 mg tablet Take 1 tablet (10 mg total) by mouth nightly. 30 tablet 2     Current Facility-Administered Medications on File Prior to Visit   Medication Dose Route Frequency Provider Last Rate Last Admin    ceFAZolin in sterile water 2 gram/20 mL IV syringe 2,000 mg  2 g Intravenous On Call Procedure Ag Hobson MD        sodium chloride 0.9% flush 10 mL  10 mL Intravenous PRN Tammy Suggs MD                 Social History     Socioeconomic History    Marital status:    Occupational History     Employer: [employer] in [place]   Tobacco Use    Smoking status: Former     Packs/day: 0.00     Years: 30.00     Pack years: 0.00     Types: Cigarettes     Quit date: 2018     Years since quittin.5    Smokeless tobacco: Never    Tobacco comments:     Pt chew nicotine gum   Substance and Sexual Activity    Alcohol use: Yes     Comment: occ    Drug use: Never       Family History   Problem Relation Age of Onset    Colon cancer Mother     Dementia Mother     Alcohol abuse Father     Heart failure Father     Hypertension Sister     Cancer Brother   "       bone    Coronary artery disease Brother     Diabetes Brother     Diabetes Brother     Hypertension Brother          Past medical and surgical history reviewed.   I have reviewed the patient's medical history in detail and updated the computerized patient record.        Review of System:   General: no chills, fever, night sweats, weight gain or weight loss  Psychological: no depression or suicidal ideation  Breasts: no new or changing breast lumps, nipple discharge or masses.  Respiratory: no cough, shortness of breath, or wheezing  Cardiovascular: no chest pain or dyspnea on exertion  Gastrointestinal: no abdominal pain, change in bowel habits, or black or bloody stools  Genito-Urinary: no incontinence, urinary frequency/urgency or vulvar/vaginal symptoms, pelvic pain or abnormal vaginal bleeding.  Musculoskeletal: no gait disturbance or muscular weakness      Physical Exam:   /80   Resp 14   Ht 5' 4" (1.626 m)   Wt 83.6 kg (184 lb 4.9 oz)   BMI 31.64 kg/m²   Constitutional: She appears alert and responsive. She appears well-developed, well-groomed, and well-nourished. No distress.   HENT:   Head: Normocephalic and atraumatic.   Eyes: Conjunctivae and EOM are normal. No scleral icterus.   Neck: Symmetrical. Normal range of motion. Neck supple. No tracheal deviation present. THYROID:  without masses or tenderness.  Cardiovascular: Normal rate, no rhythm abnormality noted. Extremities without swelling or edema, warm.    Pulmonary/Chest: Normal respiratory Effort. No distress or retractions. She exhibits no tenderness.  Breasts: are symmetrical.no masses    Right breast exhibits no inverted nipple, no mass, no nipple discharge, no skin change and no tenderness.   Left breast exhibits no inverted nipple, no mass, no nipple discharge, no skin change and no tenderness.  Abdominal: Soft. She exhibits no distension, hernias or masses. There is no tenderness. No enlargement of liver edge or spleen.  There is " no rebound and no guarding.   Genitourinary:    External rectal exam shows no thrombosed external hemorrhoids, no lesions.     Pelvic exam was performed with patient supine.   No labial fusion, and symmetrical.    There is no rash, lesion or injury on the right labia.   There is no rash, lesion or injury on the left labia.   No bleeding and no signs of injury around the vaginal introitus, urethral meatus is normal size and without prolapse or lesions, urethra well supported. The cervix is visualized with no discharge, lesions or friability.   No vaginal discharge found.    No significant Cystocele, Enterocele or rectocele, and cervix and uterus well supported.   Bimanual exam:   The urethra is normal to palpation and there are no palpable vaginal wall masses.   Uterus is not deviated, not enlarged, not fixed, normal shape and not tender.   Cervix exhibits no motion tenderness.    Right adnexum displays no mass or nodularity and no tenderness.   Left adnexum displays no mass or nodularity and no tenderness.  Musculoskeletal: Normal range of motion.   Lymphadenopathy: No inguinal adenopathy present.   Neurological: She is alert and oriented to person, place, and time. Coordination normal.   Skin: Skin is warm and dry. She is not diaphoretic. No rashes, lesions or ulcers.   Psychiatric: She has a normal mood and affect, oriented to person, place, and time.      Assessment:   Normal annual GYN exam  1. Encounter for annual routine gynecological examination        2. Well woman exam  Ambulatory referral/consult to Obstetrics / Gynecology    Liquid-Based Pap Smear, Screening      Conization 24 yrs ago    Menopause  Anxiety  Heart dis  IC hx  Thyroid disorder    Plan:   PAP  Mammogram done  Follow up in 1 year.  Patient informed will be contacted with results within 2 weeks. Encouraged to please call back or email if she has not heard from us by then.

## 2023-03-08 LAB
FINAL PATHOLOGIC DIAGNOSIS: NORMAL
Lab: NORMAL

## 2023-08-17 NOTE — PROGRESS NOTES
Outpatient Psychiatry Initial Visit  2023    ID:  64 yo F presenting for an initial evaluation. Met with patient.    Reason for encounter: Referral from Dr. Jones. Patient complains of anxiety.    History of Present Illness:  Pt. is a 64 yo F with a past psychiatric hx of Anxiety disorder, unspecified type presenting to the clinic for an initial evaluation and treatment. Past medical history outlined below. She is currently taking ALPRAZolam (XANAX), prescribed and managed by Dr. Jones. She reports that these medications have recently been increased to BID due to worsening anxiety and is hoping to get off the Xanax entirely.      Pt currently endorses or denies the following symptoms:  Psych ROS  Depression: Since Mom  5 out of 10, the past year has been the one of her worse  Anxiety: no panic attacks, no agoraphobia, no social anxiety, endorses chronic generalized anxiety  PTSD: no flashbacks, nightmares, or avoidance of stimuli  Clara/Psychosis: No manic episodes, no A/V hallucinations  SI - No SI - denies  access to guns:  denies    Past Psychiatric History:  Past Psych Hx:   anxiety            First psych contact:  anxious since a child  Prior hospitalizations:    5 weeks at [facility] r/t unmanageable life stressors  Prior suicide attempts or self harm:  denies  Prior diagnosis:  anxiety, insomnia  Prior meds:  Lyrica, Ativan, Vistaril, Trazodone  Current meds:  Xanax   Prior psychotherapy:  in the past, wants to see someone, referral placed for OP Therapy      Past Medical Hx: Hx of TBI:   denies         Hx of seizures:   denies  Past Medical History:   Diagnosis Date    Anticoagulant long-term use     Arthritis     Cystitis     interstitial cystitis    Generalized anxiety disorder     Hypertension     Mixed hyperlipidemia     Renal disorder     interstitial cystitis    Sleep apnea in adult 2022    mild case  ---don't use a C-PAP    Thyroid disease               Past Surgical Hx:  Past Surgical History:   Procedure Laterality Date    BREAST BIOPSY Left     BREAST CYST ASPIRATION       SECTION      COLONOSCOPY      COLONOSCOPY N/A 2020    Procedure: COLONOSCOPY;  Surgeon: Tammy Suggs MD;  Location: [facility];  Service: Endoscopy;  Laterality: N/A;    CYSTOSCOPY  10/11/2022    Procedure: CYSTOSCOPY;  Surgeon: Lia Resendiz MD;  [facility];  Service: Urology;;    INSTILLATION OF URINARY BLADDER  10/11/2022    Procedure: INSTILLATION, BLADDER;  Surgeon: Lia Resendiz MD;  [facility];  Service: Urology;;    LEFT HEART CATHETERIZATION Left 2021    Procedure: Left heart cath;  Surgeon: Montserrat Rodriguez MD;  Location: [facility] CATH;  Service: Cardiology;  Laterality: Left;    PILONIDAL CYST DRAINAGE      ROBOTIC ARTHROPLASTY, HIP Right 2022    Procedure: ROBOTIC ARTHROPLASTY,HIP;  Surgeon: Kermit Rao MD;  Location: [facility] OR;  Service: Orthopedics;  Laterality: Right;           Family Hx:   Paternal:  alcoholism, anxiety, depression, PTSD  Maternal:  grandfather alcoholic            Social Hx:   Childhood:  hard childhood, good mom  Marital Status:  , together 8 years,  2 years, good marriage  Children:  27 daughter lives in [place]  Resides:    Occupation:  retired    Hobbies:  reading, gardening, shopping  Muslim:  non-Restorationism  Education level:  some college  :   denies  Legal:  denies    Substance Hx:  Tobacco:  quit 5 years ago  Alcohol:  occasionally, socially  Drug use:  denies, smoked marijuana in her 20s  Caffeine:  2 daily  Rehab:  denies  Prior/current AA:  denies    Review of Symptoms  GENERAL: no weight gain/loss  SKIN: no rashes or lacerations  HEAD: no headaches  EYES: no jaundice, blindness. No exophthalmos  EARS: no dizziness, tinnitus, or hearing loss  NOSE: no changes in smell  Mouth/throat: no dyskinetic movements or obvious  "goiter  CHEST: no SOB, hyperventilation or cough  CARDIO: no tachycardia, bradycardia, or chest pain  ABDOMEN: no nausea, vomiting, pain, constipation, or diarrhea  URINARY:  no frequency, dysuria, or sexual dysfunction  ENDOCRINE: No polydipsia, polyuria, no cold/hot intolerance  MUSCULOSKELETAL: no joint pain/stiffness  NEUROLOGIC: no weakness or sensory changes, no seizures, no confusion, memory loss, or forgetfulness, no tremor or abnormal movements    Current Evaluation:  Nutritional Screening:  Considering the patient's height and weight, medications, medical history and preferences, should a referral be made to the dietitian? No  Vitals: most recent vitals signs, dated greater than 90 days prior to this appointment, were reviewed  General: age appropriate, well nourished, casually dressed, neatly groomed  MSK: muscle strength/tone : no tremor or abnormal movements. Gait/Station: no ataxic, steady    Suicide Risk Assessment:  Protective factors: age, gender, no prior attempts, no prior hospitalizations, no ongoing substance abuse, no psychosis, , has children, denies SI/intent/plan, seeking treatment, access to treatment, future oriented, good primary support, no access to firearms    Risks:     Patient is a low immediate and long-term risk considering risk factors    Psychiatric:  Speech: Normal rate, rhythm, volume. No latency, no pressured speech  Mood/Affect: euthymic, congruent and appropriate   Thought Process: organized, logical, linear  Thought Content: no suicidal or homicidal ideation, no A/V hallucinations, delusions or paranoia  Insight: Intact; aware of illness  Judgement: behavior is adequate to circumstances  Orientation: A&O x 4,  Memory: Intact for content of interview, 3/3 immediate, 3/3 after 3 mins. Able to recall recent and remote events.  Language: Grossly intact, no aphasias   Concentration: Spells "world" correctly forward & backwards  Knowledge/Intelligence: appropriate to age " "and level of education.   Spouse/Partner: Supportive    ASSESSMENT - DIAGNOSIS - GOALS:  Impression:          Pt presents to OP Psychiatry for initial evaluation and medication management of anxiety. Pt reports increased situational depression, Mom who was her biggest support passed away on May 28th, and she just returned from [place] where she was helping her sister after heart surgery. Alcoholism runs in her dad's side of the family, she used to attend [employer]- states she is co-dependent, and  is a functioning alcoholic. She reports a hx of miscarriages, and a strained relationship with her daughter currently. She has had to set boundaries with the way her daughter treats her, and states, "It is the hardest thing to do when you are a mom." Reports having a rough childhood, "but I had the best mom, and I miss her so much". Pt receptive to trying other medications for her anxiety, states "I would like to get off of it entirely". Educated the risks of increased falls, fractures, and cognitive deficits r/t benzo use in older adults. Pt verbalized understanding, performed teach back on new medication instructions, will reassess symptoms in 30 days.       Safe for outpatient tx and no acute safety concerns.  Diagnosis/Diagnoses:  - CALIXTO (generalized anxiety disorder)  - MDD (major depressive disorder), recurrent episode, mild      Strengths/Liabilities: Patient accepts feedback & guidance. Patient is motivated for change.     Treatment Goals: Specify outcomes written in observable, behavioral terms  Anxiety: acquire relapse prevention skills, reduce physical symptoms of anxiety, reduce time spent worrying (>30 minutes/day)  Depression: Acquire relapse prevention skills, increasing energy, increasing interest in usual activities, increasing motivation, reducing excessive guilt and reducing fatigue.    Treatment Plan/Recommendations:   Medication Management: The risks and benefits of medication were discussed with the " patient.   Meds:    1) Start busPIRone (BUSPAR) 5 MG Tab - Take 1 tablet (5 mg total) by mouth 2 (two) times daily for anxiety    2) Start sertraline (ZOLOFT) 50 MG tablet - Take 0.5 tablets (25 mg total) by mouth once daily for 8 days, THEN 1 tablet (50 mg total) once daily for anxiety/depression.  Discussed potential for GI side effects, sexual dysfunction, mood destabilization, headaches     3) Continue ALPRAZolam (XANAX) 0.25 MG tablet - TAKE 1 TABLET BY MOUTH TWICE DAILY AS NEEDED for anxiety. Discussed risk of decreased RT, sedation, addictive potential, and not to mix with alcohol.          Labs:  none   Return to Clinic:  30 days  Counseling time:  35 mins  Total time:  60 mins    - Patient given contact # for psychotherapists at [facility] and also instructed they may check with insurance for a list of providers.   - Call to report any worsening of symptoms or problems associated with medication  - Pt instructed to go to ER if thoughts of harming self or others arise     - Spent 60min face to face with the pt; >50% time spent in counseling   - Supportive therapy and psycho education provided  - R/B/SE's of medications discussed with the pt who expresses understanding and chooses to take medications as prescribed.   - Pt instructed to call clinic, 911 or go to nearest emergency room if symptoms worsen or pt is in crisis. The pt expresses understanding.      Radha Blackburn NP  Psychiatric-Mental Health Nurse Practitioner  Department of Psychiatry    [facility] [place] [address] [place] [address]  Phone:  [phone]  Fax: [phone]

## 2023-08-18 ENCOUNTER — OFFICE VISIT (OUTPATIENT)
Dept: PSYCHIATRY | Facility: CLINIC | Age: 63
End: 2023-08-18
Payer: OTHER GOVERNMENT

## 2023-08-18 VITALS
BODY MASS INDEX: 31.5 KG/M2 | DIASTOLIC BLOOD PRESSURE: 77 MMHG | HEIGHT: 64 IN | HEART RATE: 79 BPM | SYSTOLIC BLOOD PRESSURE: 109 MMHG | WEIGHT: 184.5 LBS

## 2023-08-18 DIAGNOSIS — F41.1 GAD (GENERALIZED ANXIETY DISORDER): Primary | ICD-10-CM

## 2023-08-18 DIAGNOSIS — F33.0 MDD (MAJOR DEPRESSIVE DISORDER), RECURRENT EPISODE, MILD: ICD-10-CM

## 2023-08-18 PROCEDURE — 99999 PR PBB SHADOW E&M-EST. PATIENT-LVL IV: CPT | Mod: PBBFAC,,,

## 2023-08-18 PROCEDURE — 90792 PSYCH DIAG EVAL W/MED SRVCS: CPT | Mod: ,,,

## 2023-08-18 PROCEDURE — 99214 OFFICE O/P EST MOD 30 MIN: CPT | Mod: PBBFAC,PO

## 2023-08-18 PROCEDURE — 99999 PR PBB SHADOW E&M-EST. PATIENT-LVL IV: ICD-10-PCS | Mod: PBBFAC,,,

## 2023-08-18 PROCEDURE — 90792 PR PSYCHIATRIC DIAGNOSTIC EVALUATION W/MEDICAL SERVICES: ICD-10-PCS | Mod: ,,,

## 2023-08-18 RX ORDER — SERTRALINE HYDROCHLORIDE 50 MG/1
TABLET, FILM COATED ORAL
Qty: 26 EACH | Refills: 0 | Status: SHIPPED | OUTPATIENT
Start: 2023-08-18 | End: 2023-09-15

## 2023-08-18 RX ORDER — BUSPIRONE HYDROCHLORIDE 5 MG/1
5 TABLET ORAL 2 TIMES DAILY
Qty: 60 TABLET | Refills: 0 | Status: SHIPPED | OUTPATIENT
Start: 2023-08-18 | End: 2023-09-29

## 2023-08-18 RX ORDER — ZINC GLUCONATE 50 MG
50 TABLET ORAL DAILY
COMMUNITY

## 2023-08-18 RX ORDER — ALPRAZOLAM 0.25 MG/1
0.25 TABLET ORAL 2 TIMES DAILY PRN
Qty: 60 TABLET | Refills: 0 | Status: SHIPPED | OUTPATIENT
Start: 2023-08-21 | End: 2023-10-12

## 2023-08-28 ENCOUNTER — PATIENT MESSAGE (OUTPATIENT)
Dept: PSYCHIATRY | Facility: CLINIC | Age: 63
End: 2023-08-28
Payer: OTHER GOVERNMENT

## 2023-09-12 ENCOUNTER — OFFICE VISIT (OUTPATIENT)
Dept: PSYCHIATRY | Facility: CLINIC | Age: 63
End: 2023-09-12
Payer: OTHER GOVERNMENT

## 2023-09-12 DIAGNOSIS — F33.0 MDD (MAJOR DEPRESSIVE DISORDER), RECURRENT EPISODE, MILD: ICD-10-CM

## 2023-09-12 DIAGNOSIS — F41.1 GAD (GENERALIZED ANXIETY DISORDER): ICD-10-CM

## 2023-09-12 PROCEDURE — 90791 PR PSYCHIATRIC DIAGNOSTIC EVALUATION: ICD-10-PCS | Mod: ,,, | Performed by: SOCIAL WORKER

## 2023-09-12 PROCEDURE — 99211 OFF/OP EST MAY X REQ PHY/QHP: CPT | Mod: PBBFAC,PO | Performed by: SOCIAL WORKER

## 2023-09-12 PROCEDURE — 99999 PR PBB SHADOW E&M-EST. PATIENT-LVL I: ICD-10-PCS | Mod: PBBFAC,,, | Performed by: SOCIAL WORKER

## 2023-09-12 PROCEDURE — 90791 PSYCH DIAGNOSTIC EVALUATION: CPT | Mod: ,,, | Performed by: SOCIAL WORKER

## 2023-09-12 PROCEDURE — 99999 PR PBB SHADOW E&M-EST. PATIENT-LVL I: CPT | Mod: PBBFAC,,, | Performed by: SOCIAL WORKER
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
## 2023-09-15 DIAGNOSIS — F33.0 MDD (MAJOR DEPRESSIVE DISORDER), RECURRENT EPISODE, MILD: ICD-10-CM

## 2023-09-15 DIAGNOSIS — F41.1 GAD (GENERALIZED ANXIETY DISORDER): ICD-10-CM

## 2023-09-15 RX ORDER — SERTRALINE HYDROCHLORIDE 50 MG/1
50 TABLET, FILM COATED ORAL DAILY
Qty: 30 TABLET | Refills: 1 | Status: SHIPPED | OUTPATIENT
Start: 2023-09-15 | End: 2023-09-29

## 2023-09-18 NOTE — PROGRESS NOTES
Outpatient Psychiatry Follow-Up Visit    Clinical Status of Patient: Outpatient (Ambulatory)  09/21/2023     Chief Complaint: Pt is a 64 yo F who presents today for a follow-up. Met with patient.       Interval History and Content of Current Session:  Interim Events/Subjective Report/Content of Current Session:  follow up appointment.    Pt is a 64 yo F with past psychiatric hx of CALIXTO (generalized anxiety disorder), MDD (major depressive disorder), recurrent episode, mild who presents for follow up treatment.     Past Psychiatric hx:   Pt. is a 64 yo F with a past psychiatric hx of Anxiety disorder, unspecified type presenting to the clinic for an initial evaluation and treatment. Past medical history outlined below. She is currently taking ALPRAZolam (XANAX), prescribed and managed by Dr. Jones. She reports that these medications have recently been increased to BID due to worsening anxiety and is hoping to get off the Xanax entirely.    Past Medical hx:   Past Medical History:   Diagnosis Date    Anticoagulant long-term use     Arthritis     Cystitis     interstitial cystitis    Generalized anxiety disorder     Hypertension     Mixed hyperlipidemia     Renal disorder     interstitial cystitis    Sleep apnea in adult 04/2022    mild case  ---don't use a C-PAP    Thyroid disease         Interim hx:  Medication changes last visit:     1) Start busPIRone (BUSPAR) 5 MG Tab - Take 1 tablet (5 mg total) by mouth 2 (two) times daily for anxiety  2) Start sertraline (ZOLOFT) 50 MG tablet - Take 0.5 tablets (25 mg total) by mouth once daily for 8 days, THEN 1 tablet (50 mg total) once daily for anxiety/depression.  Discussed potential for GI side effects, sexual dysfunction, mood destabilization, headaches   3) Continue ALPRAZolam (XANAX) 0.25 MG tablet - TAKE 1 TABLET BY MOUTH TWICE DAILY AS NEEDED for anxiety. Discussed risk of decreased RT, sedation, addictive potential, and not to mix with alcohol.     Anxiety:  same  Depression:  manageable  Sleep: same  Appetite: good     Denies suicidal/homicidal ideations.  Denies hopelessness/worthlessness.    Denies auditory/visual hallucinations      Tobacco:  quit 5 years ago  Alcohol:  occasionally, socially  Drug use:  denies, smoked marijuana in her 20s  Caffeine:  2 daily      Review of Systems   PSYCHIATRIC: Pertinent items are noted in the narrative.        CONSTITUTIONAL: weight stable        M/S: no pain today         ENT: no allergies noted today        ABD: no n/v/d     Past Medical, Family and Social History: The patient's past medical, family and social history have been reviewed and updated as appropriate within the electronic medical record. See encounter notes.     Medication Compliance: yes      Side effects: tolerates     Risk Parameters:  Patient reports no suicidal ideation  Patient reports no homicidal ideation  Patient reports no self-injurious behavior  Patient reports no violent behavior     Exam (detailed: at least 9 elements; comprehensive: all 15 elements)   Constitutional  Vitals:  Most recent vital signs, dated less than 90 days prior to this appointment, were reviewed. Pulse:  [89]   BP: (120)/(72)       General:  unremarkable, age appropriate, casual attire, good eye contact, good rapport       Musculoskeletal  Muscle Strength/Tone:  no flaccidity, no tremor    Gait & Station:  normal      Psychiatric                       Speech:  normal tone, normal rate, rhythm, and volume   Mood & Affect:   Euthymic, congruent, appropriate         Thought Process:   Goal directed; Linear    Associations:   intact   Thought Content:   No SI/HI, delusions, or paranoia, no AV/VH   Insight & Judgement:   Good, adequate to circumstances   Orientation:   grossly intact; alert and oriented x 4    Memory:  intact for content of interview    Language:  grossly intact, can repeat    Attention Span  : Grossly intact for content of interview   Fund of Knowledge:   intact and  appropriate to age and level of education        Assessment and Diagnosis   Status/Progress: Based on the examination today, the patient's problem(s) is/are under fair control.  New problems have not been presented today. Comorbidities are not currently complicating management of the primary condition.      Impression:  Pt presents to OP Psychiatry for routine follow-up for treatment/medication management of CALIXTO, MDD. Pt states the SE from SSRIs and her Buspar are making her feel worse. She reports starting to go to OP therapy and feels that is where she would like to focus going forward. Reported N&V on Zoloft, and reports feeling restless on the Buspar even after taking for over 2 weeks. She has a hx of rebound reactions for some medications and feels like she id very sensitive to them. She thoroughly felt better after her therapy session, and has been feeling much better since she stopped the medications started last month. We discussed other options if she felt the need to be seen, just to call for an appointment PRN. Pt denies SI/HI/AVH, self-harm, plan, or intent. Pt stated she will contact the office if anything changes and she needs to seek treatment.      Diagnosis:   - CALIXTO (generalized anxiety disorder)  - MDD (major depressive disorder), recurrent episode, mild      Intervention/Counseling/Treatment Plan   Medication Management:      1. Pt stopped busPIRone (BUSPAR) 10 MG Tab     2. Pt stopped sertraline (ZOLOFT) 100 MG tablet3. Continue ALPRAZolam (XANAX) 0.25 MG tablet - TAKE 1 TABLET BY MOUTH TWICE DAILY AS NEEDED for anxiety. Discussed risk of decreased RT, sedation, addictive potential, and not to mix with alcohol.      4. Call to report any worsening of symptoms or problems with the medication. Pt instructed to go to ER with thoughts of harming self, others     5. Patient given contact # for psychotherapists at [facility] and also instructed she may check with insurance for list of providers.           Labs: none         Return to clinic:      PRN if symptoms worsen    -Spent 30min face to face with the pt; >50% time spent in counseling   -Supportive therapy and psychoeducation provided  -R/B/SE's of medications discussed with the pt who expresses understanding and chooses to take medications as prescribed.   -Pt instructed to call clinic, 911 or go to nearest emergency room if sxs worsen or pt is in   crisis. The pt expresses understanding.      Radha Blackburn NP  Psychiatric-Mental Health Nurse Practitioner  Department of Psychiatry    [facility] [place] [address] [place] [address]  Phone:  [phone]  Fax: [phone]

## 2023-09-19 ENCOUNTER — TELEPHONE (OUTPATIENT)
Dept: PSYCHIATRY | Facility: CLINIC | Age: 63
End: 2023-09-19
Payer: OTHER GOVERNMENT

## 2023-09-21 ENCOUNTER — OFFICE VISIT (OUTPATIENT)
Dept: PSYCHIATRY | Facility: CLINIC | Age: 63
End: 2023-09-21
Payer: OTHER GOVERNMENT

## 2023-09-21 VITALS
HEART RATE: 89 BPM | DIASTOLIC BLOOD PRESSURE: 72 MMHG | WEIGHT: 184.94 LBS | BODY MASS INDEX: 31.75 KG/M2 | SYSTOLIC BLOOD PRESSURE: 120 MMHG

## 2023-09-21 DIAGNOSIS — F33.0 MDD (MAJOR DEPRESSIVE DISORDER), RECURRENT EPISODE, MILD: ICD-10-CM

## 2023-09-21 DIAGNOSIS — F41.0 GENERALIZED ANXIETY DISORDER WITH PANIC ATTACKS: Primary | ICD-10-CM

## 2023-09-21 DIAGNOSIS — F41.1 GENERALIZED ANXIETY DISORDER WITH PANIC ATTACKS: Primary | ICD-10-CM

## 2023-09-21 PROCEDURE — 99213 OFFICE O/P EST LOW 20 MIN: CPT | Mod: PBBFAC,PO

## 2023-09-21 PROCEDURE — 99999 PR PBB SHADOW E&M-EST. PATIENT-LVL III: CPT | Mod: PBBFAC,,,

## 2023-09-21 PROCEDURE — 99214 OFFICE O/P EST MOD 30 MIN: CPT | Mod: S$PBB,,,

## 2023-09-21 PROCEDURE — 99214 PR OFFICE/OUTPT VISIT, EST, LEVL IV, 30-39 MIN: ICD-10-PCS | Mod: S$PBB,,,

## 2023-09-21 PROCEDURE — 99999 PR PBB SHADOW E&M-EST. PATIENT-LVL III: ICD-10-PCS | Mod: PBBFAC,,,

## 2023-10-02 ENCOUNTER — PATIENT MESSAGE (OUTPATIENT)
Dept: PSYCHIATRY | Facility: CLINIC | Age: 63
End: 2023-10-02
Payer: OTHER GOVERNMENT

## 2023-10-11 ENCOUNTER — TELEPHONE (OUTPATIENT)
Dept: OTOLARYNGOLOGY | Facility: CLINIC | Age: 63
End: 2023-10-11
Payer: OTHER GOVERNMENT

## 2023-10-11 NOTE — TELEPHONE ENCOUNTER
----- Message from Negrita Ayo sent at 10/11/2023  8:45 AM CDT -----  Contact: pt  Type:  Sooner Apoointment Request    Caller is requesting a sooner appointment.  Caller declined first available appointment listed below.  Caller will not accept being placed on the waitlist and is requesting a message be sent to doctor.  Name of Caller:pt  When is the first available appointment???  Symptoms:ear issues fullness  Would the patient rather a call back or a response via [facility]? call  Best Call Back Number:[phone]  Additional Information: Pt states that she need a callback as soon as possible. States that she has a referral in the system and need to schedule as soon as possible. Please advise thank you

## 2023-10-17 ENCOUNTER — OFFICE VISIT (OUTPATIENT)
Dept: OTOLARYNGOLOGY | Facility: CLINIC | Age: 63
End: 2023-10-17
Payer: OTHER GOVERNMENT

## 2023-10-17 ENCOUNTER — CLINICAL SUPPORT (OUTPATIENT)
Dept: AUDIOLOGY | Facility: CLINIC | Age: 63
End: 2023-10-17
Payer: OTHER GOVERNMENT

## 2023-10-17 VITALS — BODY MASS INDEX: 30.78 KG/M2 | HEIGHT: 64 IN | WEIGHT: 180.31 LBS

## 2023-10-17 DIAGNOSIS — H92.13 EAR DRAINAGE, BILATERAL: ICD-10-CM

## 2023-10-17 DIAGNOSIS — H93.8X9 SENSATION OF FULLNESS IN EAR, UNSPECIFIED LATERALITY: Primary | ICD-10-CM

## 2023-10-17 DIAGNOSIS — H93.8X3 EAR PRESSURE, BILATERAL: Primary | ICD-10-CM

## 2023-10-17 PROCEDURE — 99213 OFFICE O/P EST LOW 20 MIN: CPT | Mod: PBBFAC,PO | Performed by: NURSE PRACTITIONER

## 2023-10-17 PROCEDURE — 99203 PR OFFICE/OUTPT VISIT, NEW, LEVL III, 30-44 MIN: ICD-10-PCS | Mod: S$PBB,,, | Performed by: NURSE PRACTITIONER

## 2023-10-17 PROCEDURE — 99999 PR PBB SHADOW E&M-EST. PATIENT-LVL III: ICD-10-PCS | Mod: PBBFAC,,, | Performed by: NURSE PRACTITIONER

## 2023-10-17 PROCEDURE — 92567 TYMPANOMETRY: CPT | Mod: PBBFAC,PO | Performed by: AUDIOLOGIST-HEARING AID FITTER

## 2023-10-17 PROCEDURE — 99999 PR PBB SHADOW E&M-EST. PATIENT-LVL III: CPT | Mod: PBBFAC,,, | Performed by: NURSE PRACTITIONER

## 2023-10-17 PROCEDURE — 99203 OFFICE O/P NEW LOW 30 MIN: CPT | Mod: S$PBB,,, | Performed by: NURSE PRACTITIONER

## 2023-10-17 RX ORDER — LEVOTHYROXINE SODIUM 112 UG/1
112 TABLET ORAL DAILY
COMMUNITY
Start: 2023-10-16

## 2023-10-17 NOTE — PROGRESS NOTES
Tosin Orourke was seen 10/17/2023 for tympanometry per order from Yissel Zapata NP, ENT due to complaint of potential fluid behind the TM. Pt was alone during today's visit. Results indicate Type A for the right ear indicating normal middle ear function and Type A for the left ear indicating normal middle ear function.     Results will be reviewed by ENT following this encounter. All complaints were addressed during this visit to the patient's satisfaction. Plan of care was discussed in detail with the patient, who agreed with the plan as above.

## 2023-10-17 NOTE — PROGRESS NOTES
"Subjective     Patient ID: Tosin Orourke is a 63 y.o. female.    Chief Complaint: Ear Fullness    Ear Fullness     Patient is new to ENT, referred by MARCO Dean for ear fullness. Patient states she went to [facility] several weeks ago for dizziness and was told that she had inflammation in ear; given steroid shot and Flonase. Then she saw MARCO Dean 2.5 weeks ago: "Patient went to an  for a couple weeks ago for fluid in her ears. She was also prescribed Meclizine for dizziness. This has been going on for several months. Denies otalgia. Denies fever or chills. Denies sinus congestion. Denies throat pain. Denies cough. She would like a referral to ENT." Patient reports she can often feel fluid trickling from her ear, especially at night. Patient denies dizziness or vertigo, but states her head feels "ga-ga." When asked to described this, she states it just doesn't feel right. Patient woke up this morning with tenderness to touch of antitragus, suspects sleeping on ear aggravated it. She normally wears ear plugs to sleep d/t 's snoring but has been been wearing them for the past 2 weeks after she was told there was inflammation in her left ear. Wants to know if it is okay to resume ear plug use at night as she has not been able to sleep well.     Review of Systems   Constitutional: Negative.    HENT:  Positive for ear pain.    Eyes: Negative.    Respiratory: Negative.     Cardiovascular: Negative.    Gastrointestinal: Negative.    Musculoskeletal: Negative.    Integumentary:  Negative.   Neurological: Negative.    Hematological: Negative.    Psychiatric/Behavioral: Negative.            Objective     Physical Exam  Vitals and nursing note reviewed.   Constitutional:       General: She is not in acute distress.     Appearance: She is well-developed. She is not ill-appearing or diaphoretic.   HENT:      Head: Normocephalic and atraumatic.      Right Ear: Hearing, tympanic membrane, ear canal and external ear normal. " "No middle ear effusion. Tympanic membrane is not erythematous.      Left Ear: Hearing, tympanic membrane, ear canal and external ear normal.  No middle ear effusion. Tympanic membrane is not erythematous.      Nose: Nose normal. No mucosal edema, congestion or rhinorrhea.      Comments: Type "A" tympanograms AU consistent with well-pneumatized mesotympanums and absence of middle ear effusions.   Patient shown TMs and EACs on TV monitor using video otoscopy for discussion/reassurance.      Mouth/Throat:      Mouth: Mucous membranes are not pale, not dry and not cyanotic. No oral lesions.      Tongue: No lesions.      Palate: No lesions.      Pharynx: Uvula midline. No oropharyngeal exudate or posterior oropharyngeal erythema.   Eyes:      General: Lids are normal. No scleral icterus.        Right eye: No discharge.         Left eye: No discharge.   Neck:      Thyroid: No thyroid mass or thyromegaly.      Trachea: Trachea normal. No tracheal deviation.   Cardiovascular:      Rate and Rhythm: Normal rate.   Pulmonary:      Effort: Pulmonary effort is normal. No respiratory distress.      Breath sounds: Normal air entry. No stridor. No wheezing.   Musculoskeletal:         General: Normal range of motion.      Cervical back: Normal range of motion and neck supple.   Lymphadenopathy:      Head:      Right side of head: No submental, submandibular, tonsillar, preauricular or posterior auricular adenopathy.      Left side of head: No submental, submandibular, tonsillar, preauricular or posterior auricular adenopathy.      Cervical: No cervical adenopathy.      Right cervical: No superficial or posterior cervical adenopathy.     Left cervical: No superficial or posterior cervical adenopathy.   Skin:     General: Skin is warm and dry.      Coloration: Skin is not pale.      Findings: No lesion or rash.   Neurological:      Mental Status: She is alert and oriented to person, place, and time.      Motor: Motor function is " "intact.      Coordination: Coordination is intact. Coordination normal.      Gait: Gait normal.   Psychiatric:         Attention and Perception: Attention normal.         Mood and Affect: Mood normal.         Speech: Speech normal.         Behavior: Behavior normal. Behavior is cooperative.          Assessment and Plan     1. Sensation of fullness in ear, unspecified laterality    2. Ear drainage, bilateral  -     Ambulatory referral/consult to ENT      Discussed just as one can feel perspiration moving on the skin, occasionally people are able to feel cerumen moving naturally out of the ear, which may be perceived as a trickle or fluid sensation. Assured pt no evidence of any type of ear inflammation or infection, no OE or OM. Type "A" tympanograms AU consistent with well-pneumatized mesotympanums and absence of middle ear effusions. Patient shown TMs and EACs on TV monitor using video otoscopy for discussion/reassurance. Discussed dysequilibrium versus true vertigo. Dysequilibrium is typically multifactorial: anemia, anxiety, dehydration, hypo-/hyper-glycemia, hypo-/hyper-tension, thyroid, arrhthymia, side effects of medication, gait/mobility issues, age, headaches and other neurological issues, etc. Discuss with your PCP to determine stages of workup. Patient agrees to call me for any vertigo.          No follow-ups on file.      "

## 2023-10-27 ENCOUNTER — OFFICE VISIT (OUTPATIENT)
Dept: PSYCHIATRY | Facility: CLINIC | Age: 63
End: 2023-10-27
Payer: OTHER GOVERNMENT

## 2023-10-27 DIAGNOSIS — F41.1 GAD (GENERALIZED ANXIETY DISORDER): Primary | ICD-10-CM

## 2023-10-27 DIAGNOSIS — F33.0 MDD (MAJOR DEPRESSIVE DISORDER), RECURRENT EPISODE, MILD: ICD-10-CM

## 2023-10-27 PROCEDURE — 90837 PSYTX W PT 60 MINUTES: CPT | Mod: ,,, | Performed by: SOCIAL WORKER

## 2023-10-27 PROCEDURE — 90837 PR PSYCHOTHERAPY W/PATIENT, 60 MIN: ICD-10-PCS | Mod: ,,, | Performed by: SOCIAL WORKER

## 2023-10-27 NOTE — PROGRESS NOTES
"Individual Psychotherapy (PhD/LCSW)    10/27/2023    Site:  [facility]         Therapeutic Intervention: Met with patient.  Outpatient - Supportive psychotherapy 60 min - CPT Code 55330    Chief complaint/reason for encounter: depression, anxiety, and interpersonal     Interval history and content of current session: Patient was seen this date. She was last seen on 23. Patient reported she has problems getting along with her daughter, her sister, and her niece. She shared she attempts to stay connected to her sister as she is aware her sister has mental health problems and she wants to help her and her adult children. Her sister is angry with her as Patient learned of the condition of their  mother's home, which her sister was living in, when Patient went to her sister's home to care for her and her adult children when her sister was having surgery. Patient found the home to be a "hoarder house" with animal feces everywhere. Patient reported she felt responsible for this as she convinced their other siblings to allow this sister and her adult children to live in their mother's home. Patient was highly distressed by what she found. Now Patient's sister will not talk to her because she exposed this and the sister and her children had to move out and get rid of their pets.  Patient shared her daughter is upset with her because Patient posted a private message on social media. Patient reported she thought she was sharing something helpful and did not think her daughter would be upset by it.Counselor provided Patient with grounding technique handout and other resource handout; however, was not able to review in detail due to limited time. Patient was encouraged to try guided meditation for relaxation.     Treatment plan:  Target symptoms: depression, anxiety   Why chosen therapy is appropriate versus another modality: patient responds to this modality  Outcome monitoring methods: self-report, " observation  Therapeutic intervention type: behavior modifying psychotherapy, supportive psychotherapy    Risk parameters:  Patient reports no suicidal ideation  Patient reports no homicidal ideation  Patient reports no self-injurious behavior  Patient reports no violent behavior    Verbal deficits: None    Patient's response to intervention:  The patient's response to intervention is accepting.    Progress toward goals and other mental status changes:  The patient's progress toward goals is fair .    Diagnosis:     ICD-10-CM ICD-9-CM   1. CALIXTO (generalized anxiety disorder)  F41.1 300.02   2. MDD (major depressive disorder), recurrent episode, mild  F33.0 296.31       Plan:  individual psychotherapy and medication management by physician    Return to clinic: 2 weeks    Length of Service (minutes): 60

## 2023-11-09 ENCOUNTER — PATIENT MESSAGE (OUTPATIENT)
Dept: PSYCHIATRY | Facility: CLINIC | Age: 63
End: 2023-11-09
Payer: OTHER GOVERNMENT

## 2023-11-13 ENCOUNTER — OFFICE VISIT (OUTPATIENT)
Dept: PSYCHIATRY | Facility: CLINIC | Age: 63
End: 2023-11-13
Payer: OTHER GOVERNMENT

## 2023-11-13 DIAGNOSIS — F41.1 GAD (GENERALIZED ANXIETY DISORDER): Primary | ICD-10-CM

## 2023-11-13 DIAGNOSIS — F33.0 MDD (MAJOR DEPRESSIVE DISORDER), RECURRENT EPISODE, MILD: ICD-10-CM

## 2023-11-13 PROCEDURE — 90832 PSYTX W PT 30 MINUTES: CPT | Mod: ,,, | Performed by: SOCIAL WORKER

## 2023-11-13 PROCEDURE — 90832 PR PSYCHOTHERAPY W/PATIENT, 30 MIN: ICD-10-PCS | Mod: ,,, | Performed by: SOCIAL WORKER

## 2023-11-13 NOTE — PROGRESS NOTES
"  Individual Psychotherapy (PhD/LCSW)    11/13/2023    Site:  [facility]         Therapeutic Intervention: Met with patient.  Outpatient - Supportive psychotherapy 30 min - CPT Code 56763    Chief complaint/reason for encounter: depression and anxiety     Interval history and content of current session: Patient arrived late for her appointment. She was seen for the remainder of her session, as a result. Patient indicated her mood has improved. She shared she has been actively participating and practicing meditation both in person and with the guided meditation apps introduced. Patient reported she continues to struggle with her feelings of "guilt" as it relates to her sister. This was processed and it was determined this is likely an old issue related to childhood where Patient learned she has been responsible for care-taking of others particularly her younger siblings. Patient indicated adult child of alcoholic patterns and stated she has been working on this and will continue doing so. Counselor made recommendations and suggestions [employer]. Patient's mood and affect were positive this date.    Treatment plan:  Target symptoms: depression, anxiety   Why chosen therapy is appropriate versus another modality: evidence based practice  Outcome monitoring methods: self-report, observation  Therapeutic intervention type: behavior modifying psychotherapy, supportive psychotherapy    Risk parameters:  Patient reports no suicidal ideation  Patient reports no homicidal ideation  Patient reports no self-injurious behavior  Patient reports no violent behavior    Verbal deficits: None    Patient's response to intervention:  The patient's response to intervention is accepting.    Progress toward goals and other mental status changes:  The patient's progress toward goals is fair .    Diagnosis:     ICD-10-CM ICD-9-CM   1. CALIXTO (generalized anxiety disorder)  F41.1 300.02   2. MDD (major depressive disorder), " recurrent episode, mild  F33.0 296.31       Plan:  individual psychotherapy and medication management by physician    Return to clinic: 2 weeks    Length of Service (minutes): 30

## (undated) DEVICE — SYR 10CC LUER LOCK

## (undated) DEVICE — PACK CYSTO

## (undated) DEVICE — UNDERGLOVES BIOGEL PI SIZE 7.5

## (undated) DEVICE — SET CYSTO IRRIGATION UNIV SPIK

## (undated) DEVICE — TRAY CYSTO BASIN OMC

## (undated) DEVICE — GOWN SMARTGOWN LVL4 X-LONG XL

## (undated) DEVICE — SOL IRR WATER STRL 3000 ML